# Patient Record
Sex: MALE | Race: WHITE | NOT HISPANIC OR LATINO | Employment: FULL TIME
[De-identification: names, ages, dates, MRNs, and addresses within clinical notes are randomized per-mention and may not be internally consistent; named-entity substitution may affect disease eponyms.]

---

## 2020-07-15 ENCOUNTER — RECORDS - HEALTHEAST (OUTPATIENT)
Dept: ADMINISTRATIVE | Facility: OTHER | Age: 50
End: 2020-07-15

## 2020-07-23 ENCOUNTER — AMBULATORY - HEALTHEAST (OUTPATIENT)
Dept: CARDIAC REHAB | Facility: CLINIC | Age: 50
End: 2020-07-23

## 2020-07-23 DIAGNOSIS — Z95.2 S/P AVR (AORTIC VALVE REPLACEMENT): ICD-10-CM

## 2020-07-31 ENCOUNTER — AMBULATORY - HEALTHEAST (OUTPATIENT)
Dept: CARDIAC REHAB | Facility: CLINIC | Age: 50
End: 2020-07-31

## 2020-07-31 DIAGNOSIS — Z95.2 S/P AVR (AORTIC VALVE REPLACEMENT): ICD-10-CM

## 2020-07-31 RX ORDER — METOPROLOL TARTRATE 25 MG/1
25 TABLET, FILM COATED ORAL 2 TIMES DAILY
Status: SHIPPED | COMMUNITY
Start: 2020-07-31

## 2020-07-31 RX ORDER — AZITHROMYCIN 250 MG/1
250 TABLET, FILM COATED ORAL DAILY
Status: SHIPPED | COMMUNITY
Start: 2020-07-31 | End: 2023-12-11

## 2020-07-31 RX ORDER — OXYCODONE HYDROCHLORIDE 5 MG/1
5 TABLET ORAL EVERY 4 HOURS PRN
Status: SHIPPED | COMMUNITY
Start: 2020-07-31 | End: 2023-12-11

## 2020-07-31 RX ORDER — WARFARIN SODIUM 5 MG/1
5 TABLET ORAL SEE ADMIN INSTRUCTIONS
Status: SHIPPED | COMMUNITY
Start: 2020-07-31 | End: 2023-12-11

## 2020-07-31 RX ORDER — AMOXICILLIN 250 MG
1 CAPSULE ORAL DAILY
Status: SHIPPED | COMMUNITY
Start: 2020-07-31 | End: 2023-12-11

## 2020-07-31 RX ORDER — ACETAMINOPHEN 500 MG
500 TABLET ORAL EVERY 6 HOURS PRN
Status: SHIPPED | COMMUNITY
Start: 2020-07-31

## 2020-08-04 ENCOUNTER — AMBULATORY - HEALTHEAST (OUTPATIENT)
Dept: CARDIAC REHAB | Facility: CLINIC | Age: 50
End: 2020-08-04

## 2020-08-04 DIAGNOSIS — Z95.2 S/P AVR (AORTIC VALVE REPLACEMENT): ICD-10-CM

## 2020-08-06 ENCOUNTER — AMBULATORY - HEALTHEAST (OUTPATIENT)
Dept: CARDIAC REHAB | Facility: CLINIC | Age: 50
End: 2020-08-06

## 2020-08-06 DIAGNOSIS — Z95.2 S/P AVR (AORTIC VALVE REPLACEMENT): ICD-10-CM

## 2020-08-10 ENCOUNTER — AMBULATORY - HEALTHEAST (OUTPATIENT)
Dept: CARDIAC REHAB | Facility: CLINIC | Age: 50
End: 2020-08-10

## 2020-08-10 DIAGNOSIS — Z95.2 S/P AVR (AORTIC VALVE REPLACEMENT): ICD-10-CM

## 2020-08-14 ENCOUNTER — AMBULATORY - HEALTHEAST (OUTPATIENT)
Dept: CARDIAC REHAB | Facility: CLINIC | Age: 50
End: 2020-08-14

## 2020-08-14 DIAGNOSIS — Z95.2 S/P AVR (AORTIC VALVE REPLACEMENT): ICD-10-CM

## 2020-08-17 ENCOUNTER — AMBULATORY - HEALTHEAST (OUTPATIENT)
Dept: CARDIAC REHAB | Facility: CLINIC | Age: 50
End: 2020-08-17

## 2020-08-17 DIAGNOSIS — Z95.2 S/P AVR (AORTIC VALVE REPLACEMENT): ICD-10-CM

## 2020-08-24 ENCOUNTER — AMBULATORY - HEALTHEAST (OUTPATIENT)
Dept: CARDIAC REHAB | Facility: CLINIC | Age: 50
End: 2020-08-24

## 2020-08-24 DIAGNOSIS — Z95.2 S/P AVR (AORTIC VALVE REPLACEMENT): ICD-10-CM

## 2020-08-28 ENCOUNTER — AMBULATORY - HEALTHEAST (OUTPATIENT)
Dept: CARDIAC REHAB | Facility: CLINIC | Age: 50
End: 2020-08-28

## 2020-08-28 DIAGNOSIS — Z95.2 S/P AVR (AORTIC VALVE REPLACEMENT): ICD-10-CM

## 2020-08-31 ENCOUNTER — AMBULATORY - HEALTHEAST (OUTPATIENT)
Dept: CARDIAC REHAB | Facility: CLINIC | Age: 50
End: 2020-08-31

## 2020-08-31 DIAGNOSIS — Z95.2 S/P AVR (AORTIC VALVE REPLACEMENT): ICD-10-CM

## 2020-09-04 ENCOUNTER — AMBULATORY - HEALTHEAST (OUTPATIENT)
Dept: CARDIAC REHAB | Facility: CLINIC | Age: 50
End: 2020-09-04

## 2020-09-04 DIAGNOSIS — Z95.2 S/P AVR (AORTIC VALVE REPLACEMENT): ICD-10-CM

## 2020-09-09 ENCOUNTER — AMBULATORY - HEALTHEAST (OUTPATIENT)
Dept: CARDIAC REHAB | Facility: CLINIC | Age: 50
End: 2020-09-09

## 2020-09-09 DIAGNOSIS — Z95.2 S/P AVR (AORTIC VALVE REPLACEMENT): ICD-10-CM

## 2020-09-14 ENCOUNTER — AMBULATORY - HEALTHEAST (OUTPATIENT)
Dept: CARDIAC REHAB | Facility: CLINIC | Age: 50
End: 2020-09-14

## 2020-09-14 DIAGNOSIS — Z95.2 S/P AVR (AORTIC VALVE REPLACEMENT): ICD-10-CM

## 2020-09-16 ENCOUNTER — AMBULATORY - HEALTHEAST (OUTPATIENT)
Dept: CARDIAC REHAB | Facility: CLINIC | Age: 50
End: 2020-09-16

## 2020-09-16 DIAGNOSIS — Z95.2 S/P AVR (AORTIC VALVE REPLACEMENT): ICD-10-CM

## 2020-09-21 ENCOUNTER — AMBULATORY - HEALTHEAST (OUTPATIENT)
Dept: CARDIAC REHAB | Facility: CLINIC | Age: 50
End: 2020-09-21

## 2020-09-21 DIAGNOSIS — Z95.2 S/P AVR (AORTIC VALVE REPLACEMENT): ICD-10-CM

## 2020-09-23 ENCOUNTER — AMBULATORY - HEALTHEAST (OUTPATIENT)
Dept: CARDIAC REHAB | Facility: CLINIC | Age: 50
End: 2020-09-23

## 2020-09-23 DIAGNOSIS — Z95.2 S/P AVR (AORTIC VALVE REPLACEMENT): ICD-10-CM

## 2020-09-28 ENCOUNTER — AMBULATORY - HEALTHEAST (OUTPATIENT)
Dept: CARDIAC REHAB | Facility: CLINIC | Age: 50
End: 2020-09-28

## 2020-09-28 DIAGNOSIS — Z95.2 S/P AVR (AORTIC VALVE REPLACEMENT): ICD-10-CM

## 2021-05-31 ENCOUNTER — RECORDS - HEALTHEAST (OUTPATIENT)
Dept: ADMINISTRATIVE | Facility: CLINIC | Age: 51
End: 2021-05-31

## 2021-06-04 VITALS — WEIGHT: 222.1 LBS

## 2021-06-04 VITALS — WEIGHT: 217.1 LBS

## 2021-06-04 VITALS — WEIGHT: 218.5 LBS

## 2021-06-04 VITALS — WEIGHT: 223.8 LBS

## 2021-06-04 VITALS — WEIGHT: 222 LBS

## 2021-06-04 VITALS — WEIGHT: 219.2 LBS

## 2021-06-04 VITALS — WEIGHT: 219.7 LBS

## 2021-06-04 VITALS — WEIGHT: 217.3 LBS

## 2021-06-04 VITALS — WEIGHT: 220.1 LBS

## 2021-06-04 VITALS — WEIGHT: 224.8 LBS

## 2021-06-04 VITALS — WEIGHT: 217.8 LBS

## 2021-06-04 VITALS — WEIGHT: 223.2 LBS

## 2021-06-04 VITALS — WEIGHT: 224.6 LBS

## 2021-06-05 VITALS — WEIGHT: 225 LBS

## 2021-06-05 VITALS — WEIGHT: 226.1 LBS

## 2021-06-05 VITALS — WEIGHT: 227 LBS

## 2021-06-10 NOTE — PROGRESS NOTES
ITP ASSESSMENT   Assessment Day: 30 Day    Session Number: 9  Precautions: Surgical     Diagnosis: Valve    Risk Stratification: Medium    Referring Provider: Meli Santiago MD   ITPs sent to Dr. Wilcox  EXERCISE  Exercise Assessment: Reassessment                         Exercise Plan  Goals Next 30 days  ADL'S: Goal #1: Resume riding bike, 1-2x/week    Leisure: Goal #2: Resume mowing lawn without sx.     Work: Goal #3/LTG: Increase to 6 METs or more in rehab to support goals of resuming regular walking/jogging sessions, elliptical use, and snow removal.       Education Goals: Patient can state cardiac s/s and appropriate emergency response.;Has system for taking medication.    Education Goals Met: Medication review.                          Goals Met  Initial ADL's goals met: Goal met: Pt has reached 3.9 METs on TM.    Initial Leisure goals met: Goal met: Pt is exercising 45-60 min daily.     Intial Work goals met: Goal not met: Pt has not reached 6 METs    Initial Progression: Pt has reached 4.2 METs.        Exercise Prescription  Exercise Mode: Treadmill;Bike;Nustep;Stairs    Frequency: 2x/week     Duration: 30-45 mins     Intensity / THR: 20-30 beats above resting heart rate    RPE 11-14  Progression / Met level: 4-4.5    Resistive Training?: No      Current Exercise (mins/week): 420      Interventions  Home Exercise:  Mode: walking    Frequency: 1-2x daily     Duration: 35-60 min      Education Material : Educational videos;Provide written material;Individual education and counseling;Offer educational classes      Education Completed  Exercise Education Completed: Cardiac Anatomy;Signs and Symptoms;Medication review;RPE;Emergency Plan;Home Exercise;Warm up/cool down;FITT Principles;BP/HR Reponse to exercise;Benefits of Exercise;End point of exercise              Exercise Follow-up/Discharge  Follow up/Discharge: Skilled therapy- pt needs monitored ex to safely progress to goal of 6 METs.  He also needs  "regular telemetry monitoring, as he had a lot of PACs during his first ex session.            NUTRITION  Nutrition Assessment: Reassessment      Nutrition Risk Factors:  Nutrition Risk Factors: Dyslipidemia;Overweight  Cholesterol: 162  LDL: 78  HDL: 56  Triglycerides: 139      Nutrition Plan  Interventions  Other Nutrition Intervention: Diet Class;Therapist/Pt Discussion;Educational Videos;Provide with Written Material    Education Completed  Nutrition Education Completed: Risk factor overview      Goals  Nutrition Goals (Next 30 days): Patient knows appropriate portion size;Patient will lose weight      Goals Met  Nutrition Goals Met: Completed Nutritional Risk Screen;Provided Rate your Plate Survey;Reviewed Dietitian schedule      Height, Weight, and  BMI  Weight: 222 lb 1.6 oz (100.7 kg)  Height: 5' 9.5\" (1.765 m)  BMI: 32.34      Nutrition Follow-up  Follow-up/Discharge: Pt understands what his risk factors are.        Other Risk Factors  Other Risk Factor Assessment: Reassessment      HTN Risk Factor: NA      Pre Exercise BP: 122/72  Post Exercise BP: 116/72      Tobacco Risk Factor: NA    Risk Factor Follow-up   Follow-up/Discharge: Pt reports he is compliant with his meds.         PSYCHOSOCIAL  Psychosocial Assessment: Reassessment       [place] COOP Q of L Summary Score: 20      PHQ-9 Total Score: 1      Psychosocial Risk Factor: NA      Psychosocial Plan  Interventions  Interventions: Offer educational videos and classes;Provide written material;Individual education and counseling      Education Completed  Education Completed: Relaxation/Coping Techniques      Goals  Goals (Next 30 days): Identify stressors;Improvement in [place] COOP score;Practicing stress management skills      Goals Met  Goals Met: Identified Support system      Psychosocial Follow-up  Follow-up/Discharge: Pt denies stress at this time. Pt enjoys watching sports.        Patient involved in Goal setting?: Yes      Signature: " _____________________________________________________________    Date: __________________    Time: __________________

## 2021-06-10 NOTE — PROGRESS NOTES
Constantino Laws has participated in 8 sessions of Phase II Cardiac Rehab.    Progress Report:   Cardiac Rehab Treatment Progress Report 8/6/2020 8/10/2020 8/14/2020 8/17/2020 8/24/2020   Weight 217 lbs 2 oz 217 lbs 5 oz 217 lbs 13 oz 218 lbs 8 oz 219 lbs 11 oz   Pre Exercise  HR 67 67 64 78 68   Pre Exercise /68 118/66 118/70 122/68 122/74   Pre Blood Sugar (mg/dl) - - - - -   Treadmill Peak HR 76 82 79 91 85   Treadmill Peak Blood Pressure 142/66 132/68 130/64 130/64 140/70   Heart Rate 70 71 70 80 76   Post Exercise /74 104/70 108/72 112/70 116/72   ECG SR SR SR w/rare-slightly occasional multifocal PVC's, rare PJC's/fusion complexes, rare PAC's -more noted onthe vision bike-pt asymptomatic SR SR   Total Exercise Minutes 40 45 46 45 10         Current Status:  Currently exercising without complaints or symptoms. Patient has reached 3.9 METs on the Treadmill.     If Physician recommends change in treatment plan, please place orders.        __________________________________________________      _____________  Signature                                                                                                  Date

## 2021-06-10 NOTE — PROGRESS NOTES
ITP ASSESSMENT   Assessment Day: Initial    Session Number: 1/2  Precautions: Surgical    Diagnosis: Valve    Risk Stratification: Medium    Referring Provider: System, Provider Not In  EXERCISE  Exercise Assessment: Initial       6 Minute Walk Test   Pre   Pre Exercise HR: 56                    Pre Exercise BP: 120/74      Peak  Peak HR: 86                   Peak BP: 130/68    Peak feet: 1325    Peak O2 SAT: 97    Peak RPE: 4    Peak MPH: 2.51      Symptoms:  Peak Symptoms: denies sx      5 mins. Post  5 Min Post HR: 68    5 Min Post BP: 110/60                           Exercise Plan  Goals Next 30 days  ADL'S: Goal #1: Increase MET level to 3.5 to support goal of resuming grocery shopping.    Leisure: Goal #2: Walk 30-60 mins at home, 3-4x/week.    Work: Goal #3/LTG: Increase to 6 METs or more in rehab to support goals of resuming regular walking/jogging sessions, elliptical use, and snow removal.      Education Goals: Patient can state cardiac s/s and appropriate emergency response.;Has system for taking medication.    Education Goals Met: Medication review.      Exercise Prescription  Exercise Mode: Treadmill;Bike;Nustep;Stairs    Frequency: 2x/week    Duration: 30-45 mins    Intensity / THR: 20-30 beats above resting heart rate    RPE 11-14  Progression / Met level: 3.5-4    Resistive Training?: No      Current Exercise (mins/week): 240      Interventions  Home Exercise:  Mode: Walking    Frequency: 4-5x/day    Duration: 30-40 mins, 4 mini walks      Education Material : Educational videos;Provide written material;Individual education and counseling;Offer educational classes      Education Completed  Exercise Education Completed: Cardiac Anatomy;Signs and Symptoms;Medication review;RPE;Emergency Plan;Home Exercise;Warm up/cool down;FITT Principles;BP/HR Reponse to exercise;Benefits of Exercise;End point of exercise              Exercise Follow-up/Discharge  Follow up/Discharge: Skilled therapy- pt needs  "monitored ex to safely progress to goal of 6 METs.  He also needs regular telemetry monitoring, as he had a lot of PACs during his first ex session.   NUTRITION  Nutrition Assessment: Initial      Nutrition Risk Factors:  Nutrition Risk Factors: Dyslipidemia;Overweight  Cholesterol: 162 (7/18/20)  LDL: 78  HDL: 56  Triglycerides: 139      Nutrition Plan  Interventions  Other Nutrition Intervention: Diet Class;Therapist/Pt Discussion;Educational Videos;Provide with Written Material      Education Completed  Nutrition Education Completed: Risk factor overview      Goals  Nutrition Goals (Next 30 days): Patient can identify their risk factors for CAD;Patient knows appropriate portion size;Patient will lose weight      Goals Met  Nutrition Goals Met: Completed Nutritional Risk Screen;Provided Rate your Plate Survey;Reviewed Dietitian schedule      Height, Weight, and  BMI  Weight: 220 lb 1.6 oz (99.8 kg)  Height: 5' 9.5\" (1.765 m)  BMI: 32.05      Nutrition Follow-up  Follow-up/Discharge: Pt reports drinking 4-5 drinks/week on the weekend.  Pt drinks 1-2 cans of soda per day.  Eats all kinds of meat- beef, chicken, pork, fish.  Pt reports having lost a few lbs since surgery, and wants to lose more.         Other Risk Factors  Other Risk Factor Assessment: Initial      HTN Risk Factor: NA      Pre Exercise BP: 120/74  Post Exercise BP: 110/60        Tobacco Risk Factor: NA          Risk Factor Follow-up   Follow-up/Discharge: Pt reports he is compliant with his meds.     PSYCHOSOCIAL  Psychosocial Assessment: Initial       [facility] Q of L Summary Score: 20      PHQ-9 Total Score: 1      Psychosocial Risk Factor: NA      Psychosocial Plan  Interventions  If PHQ-9 is >9, send letter to MD  Interventions: Offer educational videos and classes;Provide written material;Individual education and counseling       Education Completed  Education Completed: Relaxation/Coping Techniques      Goals  Goals (Next 30 days): " Identify stressors;Improvement in [facility] COOP score;Practicing stress management skills      Goals Met  Goals Met: Identified Support system      Psychosocial Follow-up  Follow-up/Discharge: Pt reports having a low stress level.  He also identifies a good support system, including his wife, daughter, and brother.             Patient involved in Goal setting?: Yes      Signature: _____________________________________________________________    Date: __________________    Time: __________________

## 2021-06-10 NOTE — PROGRESS NOTES
Cardiac Rehab  Phase II Assessment    Assessment Date: 07/15/2020    Diagnosis: AV stenosis  Date of Onset: 02/01/2020  Procedure: AVR  Date of Onset: 7/15/2020  ICD/Pacemaker: No Parameters: NA  Post-op Complications: none  ECG History: SR EF%:69, per echo 2/25/2020  Past Medical History: s/p hernia repair; Factor V defeciency; no DM; lifetime non-smoker; denies lung disease;     Physical Assessment  Precautions/ Physical Limitations: Surgical  Oxygen: No  O2 Sats: 95 Lung Sounds: clear Edema: none  Incisions: clean/dry/intact  Sleeping Pattern: fair   Appetite: good   Nutrition Risk Screen: Completed.    Pain  Location: incisional  Characteristics:pain  Intensity: (0-10 scale) 3  Current Pain Management: Tylenol, oxycodone  Intervention: Tylenol, oxycodone  Response: decreases to 0-1/10    Psychosocial/ Emotional Health  1. In the past 12 months, have you been in a relationship where you have been abused physically, emotionally, sexually or financially? No  notified: NA  2. Who do you turn to for emotional support?: wife, daughter, brother  3. Do you have cultural or spiritual needs? No  4. Have there been any major life changes in the past 12 months? No    Referral Information  Primary Physician: Meli Santiago MD  Cardiologist:   Surgeon: Dr. Alexey Herron    Home exercise/Equipment: TM, elliptical    Patient's long-term goal(s): Resume walk/jog intervals, resume yardwork    1. Living Accommodations: Home Steps: Yes      Support people at home: wife, daughter   2. Marital Status:   3. Family is able to assist with cares      Oriental orthodox/Community involvement: none  4. Recreation/Hobbies: fishing, golfing

## 2021-06-11 NOTE — PROGRESS NOTES
ITP ASSESSMENT   Assessment Day: 60 Day    Session Number: 16  Precautions: Surgical     Diagnosis: Valve    Risk Stratification: Medium    Referring Provider: Meli Santiago MD   ITPs sent to Dr. Wilcox   EXERCISE  Exercise Assessment: Reassessment                         Exercise Plan  Goals Next 30 days  ADL'S: Goal #1: Resume riding bike, 1-2x/week    Leisure: Goal #2: Resume mowing lawn without sx.     Work: Goal #3/LTG: Increase to 6 METs or more in rehab to support goals of resuming regular walking/jogging sessions, elliptical use, and snow removal.       Education Goals: All goals in this section met    Education Goals Met: Medication review.;Patient can state cardiac s/s and appropriate emergency response.;Has system for taking medication.                          Goals Met  30 day ADL'S goals met: Goal 1 met, pt riding bike at home 1-2x/week    30 day Leisure goals met: Goal 2 met, pt tolerating mowing the lawn    30 day Work goals met: LTG not met, continues progressing towards 6 METS    30 Day Progression: Pt has reached 5.4 MET level      Initial ADL's goals met: Goal met: Pt has reached 3.9 METs on TM.    Initial Leisure goals met: Goal met: Pt is exercising 45-60 min daily.     Intial Work goals met: Goal not met: Pt has not reached 6 METs    Initial Progression: Pt has reached 4.2 METs.        Exercise Prescription  Exercise Mode: Treadmill;Bike;Nustep;Stairs    Frequency: 2x/week    Duration: 30-45 min    Intensity / THR: 20-30 beats above resting heart rate; ([place] 137/152 bpm)    RPE 11-14  Progression / Met level: 5.4-6    Resistive Training?: No      Current Exercise (mins/week): 420      Interventions  Home Exercise:  Mode: walking/intervals on treadmill    Frequency: 4x/week    Duration: 35-60 min      Education Material : Educational videos;Provide written material;Individual education and counseling;Offer educational classes      Education Completed  Exercise Education Completed:  "Cardiac Anatomy;Signs and Symptoms;Medication review;RPE;Emergency Plan;Home Exercise;Warm up/cool down;FITT Principles;BP/HR Reponse to exercise;Benefits of Exercise;End point of exercise              Exercise Follow-up/Discharge  Follow up/Discharge: Skilled therapy- pt needs monitored ex to safely progress to goal of 6 METs.  He also needs regular telemetry monitoring, as he had a lot of PACs during his first ex session.    NUTRITION  Nutrition Assessment: Reassessment      Nutrition Risk Factors:  Nutrition Risk Factors: Dyslipidemia;Overweight  Cholesterol: 162  LDL: 78  HDL: 56  Triglycerides: 139      Nutrition Plan  Interventions  Other Nutrition Intervention: Diet Class;Therapist/Pt Discussion;Educational Videos;Provide with Written Material    Follow-Up Rate Your Plate Score: 71      Education Completed  Nutrition Education Completed: Risk factor overview      Goals  Nutrition Goals (Next 30 days): Patient knows appropriate portion size;Patient will lose weight      Goals Met  Nutrition Goals Met: Completed Nutritional Risk Screen;Provided Rate your Plate Survey;Reviewed Dietitian schedule      Height, Weight, and  BMI  Weight: 226 lb 1.6 oz (102.6 kg)  Height: 5' 9.5\" (1.765 m)  BMI: 32.92      Nutrition Follow-up  Follow-up/Discharge: Pt feels he could improve on his diet but is doing well.          Other Risk Factors  Other Risk Factor Assessment: Reassessment      HTN Risk Factor: NA      Pre Exercise BP: 114/66  Post Exercise BP: 130/88      Tobacco Risk Factor: NA   PSYCHOSOCIAL  Psychosocial Assessment: Reassessment       [facility] Q of L Summary Score: 20      PHQ-9 Total Score: 1      Psychosocial Risk Factor: NA      Psychosocial Plan  Interventions  Interventions: Offer educational videos and classes;Provide written material;Individual education and counseling      Education Completed  Education Completed: Relaxation/Coping Techniques      Goals  Goals (Next 30 days): Improvement in " [place] COOP score      Goals Met  Goals Met: Identified Support system      Psychosocial Follow-up  Follow-up/Discharge: Pt denies stress at this time and enjoys watching sports to relax.        Patient involved in Goal setting?: Yes      Signature: _____________________________________________________________    Date: __________________    Time: __________________

## 2021-06-11 NOTE — PROGRESS NOTES
Constantino Laws has participated in 11 sessions of Phase II Cardiac Rehab.    Progress Report:   Cardiac Rehab Treatment Progress Report 8/17/2020 8/24/2020 8/28/2020 8/31/2020 9/4/2020   Weight 218 lbs 8 oz 219 lbs 11 oz 222 lbs 2 oz 222 lbs 223 lbs 3 oz   Pre Exercise  HR 78 68 66 67 66   Pre Exercise /68 122/74 122/72 122/84 118/70   Pre Blood Sugar (mg/dl) - - - - -   Treadmill Peak HR 91 85 86 92 89   Treadmill Peak Blood Pressure 130/64 140/70 144/70 140/78 130/68   Nustep Peak Heart Rate - - 77 78 80   Heart Rate 80 76 71 70 67   Post Exercise /70 116/72 108/68 120/82 112/68   ECG SR SR SR SR SR   Total Exercise Minutes 45 40 40 46 40         Current Status:  Currently exercising without complaints or symptoms.    Therapists Comments: Patient had AVR on 7/15/2020, post op 6 weeks date was 8/26/2020, patient is tolerating exercise well. Is it okay for interval training? yes or no    If Physician recommends change in treatment plan, please place orders.        __________________________________________________      _____________  Signature                                                                                                  Date

## 2021-06-12 NOTE — PROGRESS NOTES
ITP ASSESSMENT   Assessment Day: 90 Day    Session Number: 17  Precautions: Surgical     Diagnosis: Valve    Risk Stratification: Medium    Referring Provider: Meli Santiago MD   ITP sent to Dr. Wilcox  EXERCISE  Exercise Assessment: Discharge                          Exercise Plan  Goals Next 30 days  Work: Goal #3/LTG: Increase to 6 METs or more in rehab to support goals of resuming regular walking/jogging sessions, elliptical use, and snow removal.       Education Goals: All goals in this section met    Education Goals Met: Medication review.;Patient can state cardiac s/s and appropriate emergency response.;Has system for taking medication.                          Goals Met  60 day Work goals met: Patient reached 5.7 METs on interval training on treadmill.  Patient resumed jogging on high intervals.    60 Day Progression: Patient has reached 5.7 METs on treadmill intervals      30 day ADL'S goals met: Goal 1 met, pt riding bike at home 1-2x/week    30 day Leisure goals met: Goal 2 met, pt tolerating mowing the lawn    30 day Work goals met: LTG not met, continues progressing towards 6 METS    30 Day Progression: Pt has reached 5.4 MET level      Initial ADL's goals met: Goal met: Pt has reached 3.9 METs on TM.    Initial Leisure goals met: Goal met: Pt is exercising 45-60 min daily.     Intial Work goals met: Goal not met: Pt has not reached 6 METs    Initial Progression: Pt has reached 4.2 METs.      Exercise Prescription  Exercise Mode: Treadmill;Bike;Nustep;Stairs    Frequency: 2x/week    Duration: 30-45 min    Intensity / THR: 20-30 beats above resting heart rate; ([place] 137/152 bpm)    RPE 11-14  Progression / Met level: 5.4-6    Resistive Training?: No    Interventions  Home Exercise:  Mode: walking/intervals on treadmill    Frequency: 4x/week    Duration: 35-60 min    Education Material : Educational videos;Provide written material;Individual education and counseling;Offer educational  "classes    Education Completed  Exercise Education Completed: Cardiac Anatomy;Signs and Symptoms;Medication review;RPE;Emergency Plan;Home Exercise;Warm up/cool down;FITT Principles;BP/HR Reponse to exercise;Benefits of Exercise;End point of exercise              Exercise Follow-up/Discharge  Follow up/Discharge: Skilled therapy- pt needs monitored ex to safely progress to goal of 6 METs.  He also needs regular telemetry monitoring, as he had a lot of PACs during his first ex session.    NUTRITION  Nutrition Assessment: Discharge    Nutrition Risk Factors:  Nutrition Risk Factors: Dyslipidemia;Overweight  Cholesterol: 162  LDL: 78  HDL: 56  Triglycerides: 139      Nutrition Plan  Interventions  Other Nutrition Intervention: Diet Class;Therapist/Pt Discussion;Educational Videos;Provide with Written Material    Follow-Up Rate Your Plate Score: 71      Education Completed  Nutrition Education Completed: Risk factor overview      Goals  Nutrition Goals (Next 30 days): Patient knows appropriate portion size;Patient will lose weight      Goals Met  Nutrition Goals Met: Completed Nutritional Risk Screen;Provided Rate your Plate Survey;Reviewed Dietitian schedule      Height, Weight, and  BMI  Weight: 225 lb (102.1 kg)  Height: 5' 9.5\" (1.765 m)  BMI: 32.76      Nutrition Follow-up  Follow-up/Discharge: Pt feels he could improve on his diet but is doing well.          Other Risk Factors  Other Risk Factor Assessment: Discharge      HTN Risk Factor: NA      Pre Exercise BP: 118/60  Post Exercise BP: 112/64      Tobacco Risk Factor: NA       PSYCHOSOCIAL  Psychosocial Assessment: Discharge       [facility]: [facility]      PHQ-9 Total Score: 0      Psychosocial Risk Factor: NA      Psychosocial Plan  Interventions  Interventions: Offer educational videos and classes;Provide written material;Individual education and counseling      Education Completed  Education Completed: Relaxation/Coping " Techniques      Goals  Goals (Next 30 days): Improvement in [employer] COOP score      Goals Met  Goals Met: Identified Support system;Improvement in [place] COOP score      Psychosocial Follow-up  Follow-up/Discharge: Pt denies stress at this time and enjoys watching sports to relax.        Patient involved in Goal setting?: Yes      Signature: _____________________________________________________________    Date: __________________    Time: __________________

## 2023-12-11 DIAGNOSIS — J01.01 ACUTE RECURRENT MAXILLARY SINUSITIS: Primary | ICD-10-CM

## 2023-12-11 RX ORDER — AZITHROMYCIN 250 MG/1
TABLET, FILM COATED ORAL
Qty: 6 TABLET | Refills: 0 | Status: SHIPPED | OUTPATIENT
Start: 2023-12-11 | End: 2023-12-29

## 2023-12-24 ENCOUNTER — HEALTH MAINTENANCE LETTER (OUTPATIENT)
Age: 53
End: 2023-12-24

## 2023-12-29 ENCOUNTER — VIRTUAL VISIT (OUTPATIENT)
Dept: FAMILY MEDICINE | Facility: CLINIC | Age: 53
End: 2023-12-29
Payer: COMMERCIAL

## 2023-12-29 DIAGNOSIS — J01.01 ACUTE RECURRENT MAXILLARY SINUSITIS: Primary | ICD-10-CM

## 2023-12-29 DIAGNOSIS — Z12.11 SCREEN FOR COLON CANCER: ICD-10-CM

## 2023-12-29 PROBLEM — Z79.01 ANTICOAGULATION MONITORING, INR RANGE 2-3: Status: ACTIVE | Noted: 2020-07-22

## 2023-12-29 PROCEDURE — 99203 OFFICE O/P NEW LOW 30 MIN: CPT | Mod: VID | Performed by: NURSE PRACTITIONER

## 2023-12-29 RX ORDER — AZITHROMYCIN 250 MG/1
TABLET, FILM COATED ORAL
Qty: 6 TABLET | Refills: 1 | Status: SHIPPED | OUTPATIENT
Start: 2023-12-29 | End: 2024-01-03

## 2023-12-29 RX ORDER — ROSUVASTATIN CALCIUM 40 MG/1
40 TABLET, COATED ORAL DAILY
COMMUNITY

## 2023-12-29 NOTE — PROGRESS NOTES
Constantino is a 53 year old who is being evaluated via a billable video visit.      How would you like to obtain your AVS? MyChart  If the video visit is dropped, the invitation should be resent by: Text to cell phone: [phone]  Will anyone else be joining your video visit? No      Subjective   Constantino is a 53 year old, presenting for the following health issues:  Sinus Problem (X 1 week. Mucinex a little helpful.  sinus pressure, tender to touch.)        12/29/2023    10:59 AM   Additional Questions   Roomed by Josefina MORRISON       Sinus Problem     History of Present Illness       Reason for visit:  Sinus infection  Symptom onset:  3-7 days ago  Symptoms include:  Stuffy  Symptom intensity:  Mild  Symptom progression:  Staying the same  Had these symptoms before:  Yes  Has tried/received treatment for these symptoms:  Yes  Previous treatment was successful:  Yes  Prior treatment description:  Anti biotic    He eats 0-1 servings of fruits and vegetables daily.He consumes 1 sweetened beverage(s) daily.He exercises with enough effort to increase his heart rate 20 to 29 minutes per day.  He exercises with enough effort to increase his heart rate 5 days per week.   He is taking medications regularly.       Hyperlipidemia Follow-Up    Are you regularly taking any medication or supplement to lower your cholesterol?   Yes-    Are you having muscle aches or other side effects that you think could be caused by your cholesterol lowering medication?  No      Review of Systems   Constitutional, HEENT, cardiovascular, pulmonary, gi and gu systems are negative, except as otherwise noted.    Sinu pain and pressure x 1 week, purulent drainage.  Hx sinus infections.    Objective           Vitals:  No vitals were obtained today due to virtual visit.    Physical Exam   GENERAL: Healthy, alert and no distress  EYES: Eyes grossly normal to inspection.  No discharge or erythema, or obvious scleral/conjunctival abnormalities.  RESP: No audible  wheeze, cough, or visible cyanosis.  No visible retractions or increased work of breathing.    SKIN: Visible skin clear. No significant rash, abnormal pigmentation or lesions.  NEURO: Cranial nerves grossly intact.  Mentation and speech appropriate for age.  PSYCH: Mentation appears normal, affect normal/bright, judgement and insight intact, normal speech and appearance well-groomed.    [facility] results reviewed    A/P:  (J01.01) Acute recurrent maxillary sinusitis  (primary encounter diagnosis)  Comment:   Plan: azithromycin (ZITHROMAX) 250 MG tablet                    Video-Visit Details    Type of service:  Video Visit     Originating Location (pt. Location): Home    Distant Location (provider location):  On-site  Platform used for Video Visit: Alec

## 2024-02-26 DIAGNOSIS — J01.01 ACUTE RECURRENT MAXILLARY SINUSITIS: Primary | ICD-10-CM

## 2024-02-26 RX ORDER — AZITHROMYCIN 250 MG/1
TABLET, FILM COATED ORAL
Qty: 6 TABLET | Refills: 1 | Status: SHIPPED | OUTPATIENT
Start: 2024-02-26 | End: 2024-03-02

## 2024-09-25 ENCOUNTER — APPOINTMENT (OUTPATIENT)
Dept: ULTRASOUND IMAGING | Facility: CLINIC | Age: 54
End: 2024-09-25
Attending: EMERGENCY MEDICINE
Payer: COMMERCIAL

## 2024-09-25 ENCOUNTER — APPOINTMENT (OUTPATIENT)
Dept: CT IMAGING | Facility: CLINIC | Age: 54
End: 2024-09-25
Attending: EMERGENCY MEDICINE
Payer: COMMERCIAL

## 2024-09-25 ENCOUNTER — HOSPITAL ENCOUNTER (EMERGENCY)
Facility: CLINIC | Age: 54
Discharge: HOME | End: 2024-09-25
Attending: EMERGENCY MEDICINE | Admitting: EMERGENCY MEDICINE
Payer: COMMERCIAL

## 2024-09-25 ENCOUNTER — TELEPHONE (OUTPATIENT)
Dept: SURGERY | Facility: CLINIC | Age: 54
End: 2024-09-25

## 2024-09-25 VITALS
OXYGEN SATURATION: 97 % | TEMPERATURE: 97.6 F | HEART RATE: 62 BPM | DIASTOLIC BLOOD PRESSURE: 103 MMHG | RESPIRATION RATE: 18 BRPM | HEIGHT: 69 IN | WEIGHT: 230 LBS | BODY MASS INDEX: 34.07 KG/M2 | SYSTOLIC BLOOD PRESSURE: 162 MMHG

## 2024-09-25 DIAGNOSIS — K40.20 BILATERAL INGUINAL HERNIA WITHOUT OBSTRUCTION OR GANGRENE, RECURRENCE NOT SPECIFIED: ICD-10-CM

## 2024-09-25 LAB
ALBUMIN SERPL BCG-MCNC: 4.2 G/DL (ref 3.5–5.2)
ALBUMIN UR-MCNC: NEGATIVE MG/DL
ALP SERPL-CCNC: 79 U/L (ref 40–150)
ALT SERPL W P-5'-P-CCNC: 46 U/L (ref 0–70)
ANION GAP SERPL CALCULATED.3IONS-SCNC: 13 MMOL/L (ref 7–15)
APPEARANCE UR: CLEAR
AST SERPL W P-5'-P-CCNC: 38 U/L (ref 0–45)
BILIRUB DIRECT SERPL-MCNC: <0.2 MG/DL (ref 0–0.3)
BILIRUB SERPL-MCNC: 0.3 MG/DL
BILIRUB UR QL STRIP: NEGATIVE
BUN SERPL-MCNC: 17.9 MG/DL (ref 6–20)
CALCIUM SERPL-MCNC: 9.4 MG/DL (ref 8.8–10.4)
CHLORIDE SERPL-SCNC: 103 MMOL/L (ref 98–107)
COLOR UR AUTO: YELLOW
CREAT SERPL-MCNC: 0.89 MG/DL (ref 0.67–1.17)
EGFRCR SERPLBLD CKD-EPI 2021: >90 ML/MIN/1.73M2
GLUCOSE SERPL-MCNC: 95 MG/DL (ref 70–99)
GLUCOSE UR STRIP-MCNC: NEGATIVE MG/DL
HCO3 SERPL-SCNC: 23 MMOL/L (ref 22–29)
HGB UR QL STRIP: NEGATIVE
HOLD SPECIMEN: NORMAL
HOLD SPECIMEN: NORMAL
KETONES UR STRIP-MCNC: NEGATIVE MG/DL
LEUKOCYTE ESTERASE UR QL STRIP: NEGATIVE
MUCOUS THREADS #/AREA URNS LPF: PRESENT /LPF
NITRATE UR QL: NEGATIVE
NT-PROBNP SERPL-MCNC: 185 PG/ML (ref 0–900)
PH UR STRIP: 6 [PH] (ref 5–7)
POTASSIUM SERPL-SCNC: 4.5 MMOL/L (ref 3.4–5.3)
PROT SERPL-MCNC: 7.1 G/DL (ref 6.4–8.3)
RBC URINE: <1 /HPF
SODIUM SERPL-SCNC: 139 MMOL/L (ref 135–145)
SP GR UR STRIP: 1.02 (ref 1–1.03)
UROBILINOGEN UR STRIP-MCNC: <2 MG/DL
WBC URINE: <1 /HPF

## 2024-09-25 PROCEDURE — 81001 URINALYSIS AUTO W/SCOPE: CPT | Performed by: EMERGENCY MEDICINE

## 2024-09-25 PROCEDURE — 82374 ASSAY BLOOD CARBON DIOXIDE: CPT | Performed by: EMERGENCY MEDICINE

## 2024-09-25 PROCEDURE — 87491 CHLMYD TRACH DNA AMP PROBE: CPT | Performed by: EMERGENCY MEDICINE

## 2024-09-25 PROCEDURE — 76870 US EXAM SCROTUM: CPT

## 2024-09-25 PROCEDURE — 84075 ASSAY ALKALINE PHOSPHATASE: CPT | Performed by: EMERGENCY MEDICINE

## 2024-09-25 PROCEDURE — 74177 CT ABD & PELVIS W/CONTRAST: CPT

## 2024-09-25 PROCEDURE — 250N000011 HC RX IP 250 OP 636: Performed by: EMERGENCY MEDICINE

## 2024-09-25 PROCEDURE — 83880 ASSAY OF NATRIURETIC PEPTIDE: CPT | Performed by: EMERGENCY MEDICINE

## 2024-09-25 PROCEDURE — 87591 N.GONORRHOEAE DNA AMP PROB: CPT | Performed by: EMERGENCY MEDICINE

## 2024-09-25 PROCEDURE — 99285 EMERGENCY DEPT VISIT HI MDM: CPT | Mod: 25

## 2024-09-25 PROCEDURE — 36415 COLL VENOUS BLD VENIPUNCTURE: CPT | Performed by: EMERGENCY MEDICINE

## 2024-09-25 RX ORDER — IOPAMIDOL 755 MG/ML
90 INJECTION, SOLUTION INTRAVASCULAR ONCE
Status: COMPLETED | OUTPATIENT
Start: 2024-09-25 | End: 2024-09-25

## 2024-09-25 RX ADMIN — IOPAMIDOL 90 ML: 755 INJECTION, SOLUTION INTRAVENOUS at 14:05

## 2024-09-25 ASSESSMENT — ENCOUNTER SYMPTOMS
SHORTNESS OF BREATH: 0
DYSURIA: 1
DIFFICULTY URINATING: 0
COUGH: 0
FEVER: 0
ABDOMINAL PAIN: 0

## 2024-09-25 ASSESSMENT — ACTIVITIES OF DAILY LIVING (ADL)
ADLS_ACUITY_SCORE: 35
ADLS_ACUITY_SCORE: 35
ADLS_ACUITY_SCORE: 33
ADLS_ACUITY_SCORE: 35

## 2024-09-25 ASSESSMENT — COLUMBIA-SUICIDE SEVERITY RATING SCALE - C-SSRS
1. IN THE PAST MONTH, HAVE YOU WISHED YOU WERE DEAD OR WISHED YOU COULD GO TO SLEEP AND NOT WAKE UP?: NO
2. HAVE YOU ACTUALLY HAD ANY THOUGHTS OF KILLING YOURSELF IN THE PAST MONTH?: NO
6. HAVE YOU EVER DONE ANYTHING, STARTED TO DO ANYTHING, OR PREPARED TO DO ANYTHING TO END YOUR LIFE?: NO

## 2024-09-25 NOTE — ED NOTES
Bed: [facility]-20  Expected date:   Expected time:   Means of arrival:   Comments:  Groin swelling will go here once clean

## 2024-09-25 NOTE — PROGRESS NOTES
53-year-old male diagnosed with bilateral inguinal hernias that are very large.  Patient is not having any obstructive symptoms at this time.  Recommendation is patient can be discharged from the ED if he is stable.  I will have my surgical team contact the patient for a clinic follow-up 10/1/2024 to discuss surgical intervention.    Plan   - okay for discharge stable from ED perspective  -If the patient has worsening symptoms this weekend, the patient can come to [facility] ED to be further evaluated.  I am the surgeon on-call over the weekend.    Jj Blake,   General Surgeon  [facility]  Surgery Clinic - [address]  [place] [address]?  Office: [phone]  Employed by - [facility] [employer]  Pager: [phone]

## 2024-09-25 NOTE — ED TRIAGE NOTES
"Pt was at urgent care and sent here for inguinal hernia. Pt states \"very swollen privates\" states started last weekend, had been doing some lifting at work last weekend. Denies pain. Denies fevers. Denies urinary Sx, denies n/v. Urgent care paperwork states very swollen scrotum and large inguinal hernia.         "

## 2024-09-25 NOTE — ED PROVIDER NOTES
EMERGENCY DEPARTMENT ENCOUNTER       ED Course & Medical Decision Making     11:49 AM I met the patient and performed my initial interview and exam.  1:34 PM Rechecked and updated patient.    I saw and examined the patient.  He is not having any significant pain and did not require any additional medications.  Diagnostics ordered.    He denies possibility of STI but GC and chlamydia urine was ordered and is pending.     Laboratory studies returned unremarkable.  Ultrasound is consistent with bilateral inguinal hernias and there is no sign of torsion no ultrasound evidence for epididymitis or orchitis.    CT scan confirms bilateral fat-containing inguinal hernias with secondary scrotal swelling.    I did discuss the case with Dr. Blake, general surgery and plan is to expedite surgical repair though this does not need to be emergently done.    His office will get in contact with the patient for scheduling but if anything worsens or if the patient develops pain before then he would go to [facility] and get all of the surgical team then through the er      Medical Decision Making  Obtained supplemental history:Supplemental history obtained?: Documented in chart  Reviewed external records: External records reviewed?: Documented in chart  Care impacted by chronic illness:Anticoagulated State, Hyperlipidemia, and Other: Factor V Leiden  Care significantly affected by social determinants of health:N/A  Did you consider but not order tests?: Work up considered but not performed and documented in chart, if applicable  Did you interpret images independently?: Independent interpretation of ECG and images noted in documentation, when applicable.  Consultation discussion with other provider:Did you involve another provider (consultant, MH, pharmacy, etc.)?: I discussed the care with another health care provider, see documentation for details.  Discharge. No recommendations on prescription strength medication(s). See  "documentation for any additional details.        Prior to making a final disposition on this patient the results of patient's tests and other diagnostic studies were discussed with the patient. All questions were answered. Patient expressed understanding of the plan and was amenable to it.    Medications   iopamidol (ISOVUE-370) solution 90 mL (90 mLs Intravenous $Given 9/25/24 7807)       Final Impression     1. Bilateral inguinal hernia without obstruction or gangrene, recurrence not specified            Chief Complaint     Chief Complaint   Patient presents with    Groin Swelling    Penis/Scrotum Problem       Pt was at urgent care and sent here for inguinal hernia. Pt states \"very swollen privates\" states started last weekend, had been doing some lifting at work last weekend. Denies pain. Denies fevers. Denies urinary Sx, denies n/v. Urgent care paperwork states very swollen scrotum and large inguinal hernia.             HPI       Constantino Laws is a pleasant 53 year old male, history of hyperlipidemia, who presents via walk-in for evaluation of groin selling.     On 20-Sep-2024, the patient was flying for work and was stopped by the TSA, which is when he noted increased groin and scrotum swelling. The swelling has not improved since then, and today he went to urgent care. They suspected hernia and sent the patient to the ER for further work up. He notes a very minor amount of dysuria.    The patient denies any heavy lifting. He denies testicular pain, difficulty urinating, and bowel abnormalities.       IPuneet am serving as a scribe to document services personally performed by Alexey Navarro M.D. based on my observation and the provider's statements to me. I, Alexey Navarro M.D attest that Puneet Kong is acting in a scribe capacity, has observed my performance of the services and has documented them in accordance with my direction.    Past Medical History     Past Medical History:   Diagnosis " "Date    Aortic valve stenosis      Past Surgical History:   Procedure Laterality Date    AORTIC VALVE SURGERY  07/2020    HERNIA REPAIR  12/12/2013    WISDOM TOOTH EXTRACTION      teenager     Family History   Problem Relation Age of Onset    Hyperlipidemia Mother     No Known Problems Brother     Hyperlipidemia Maternal Grandmother     Hyperlipidemia Maternal Grandfather     Heart Disease Maternal Grandfather       Social History     Tobacco Use    Smoking status: Never     Passive exposure: Never    Smokeless tobacco: Never   Vaping Use    Vaping status: Never Used   Substance Use Topics    Alcohol use: Yes    Drug use: Never       Relevant past medical, surgical, family and social history as documented above, has been reviewed and discussed with patient. No changes or additions, unless otherwise noted in the HPI.    Current Medications     acetaminophen (TYLENOL) 500 MG tablet  aspirin 81 MG EC tablet  cholecalciferol, vitamin D3, 1,000 unit (25 mcg) tablet  metoprolol tartrate (LOPRESSOR) 25 MG tablet  multivitamin therapeutic tablet  rosuvastatin (CRESTOR) 40 MG tablet        Allergies     No Known Allergies    Review of Systems     Review of Systems   Constitutional:  Negative for fever.   Respiratory:  Negative for cough and shortness of breath.    Cardiovascular:  Negative for chest pain.   Gastrointestinal:  Negative for abdominal pain.        Negative for bowel abnormalities   Genitourinary:  Positive for dysuria and scrotal swelling. Negative for difficulty urinating and testicular pain.   Skin:  Negative for rash.   All other systems reviewed and are negative.       Remainder of systems reviewed, unless noted in HPI all others negative.    Physical Exam     BP (!) 162/103   Pulse 62   Temp 97.6  F (36.4  C) (Temporal)   Resp 18   Ht 1.753 m (5' 9\")   Wt 104.3 kg (230 lb)   SpO2 97%   BMI 33.97 kg/m      Physical Exam  Vitals and nursing note reviewed.   Constitutional:       General: He is not in " acute distress.  HENT:      Head: Normocephalic.      Nose: Nose normal.   Eyes:      General: No scleral icterus.  Cardiovascular:      Rate and Rhythm: Normal rate.   Pulmonary:      Effort: Pulmonary effort is normal.   Abdominal:      Hernia: A hernia is present. Hernia is present in the left inguinal area and right inguinal area.   Genitourinary:     Penis: Normal. No tenderness or discharge.       Testes: Cremasteric reflex is present.         Right: Swelling present. Tenderness not present. Cremasteric reflex is present.          Left: Swelling present. Tenderness not present. Cremasteric reflex is present.    Musculoskeletal:      Cervical back: Neck supple.   Skin:     Findings: No rash.   Neurological:      Mental Status: He is alert. Mental status is at baseline.   Psychiatric:         Mood and Affect: Mood normal.             Labs & Imaging         Labs Ordered and Resulted from Time of ED Arrival to Time of ED Departure   ROUTINE UA WITH MICROSCOPIC REFLEX TO CULTURE - Abnormal       Result Value    Color Urine Yellow      Appearance Urine Clear      Glucose Urine Negative      Bilirubin Urine Negative      Ketones Urine Negative      Specific Gravity Urine 1.025      Blood Urine Negative      pH Urine 6.0      Protein Albumin Urine Negative      Urobilinogen Urine <2.0      Nitrite Urine Negative      Leukocyte Esterase Urine Negative      Mucus Urine Present (*)     RBC Urine <1      WBC Urine <1     BASIC METABOLIC PANEL - Normal    Sodium 139      Potassium 4.5      Chloride 103      Carbon Dioxide (CO2) 23      Anion Gap 13      Urea Nitrogen 17.9      Creatinine 0.89      GFR Estimate >90      Calcium 9.4      Glucose 95     HEPATIC FUNCTION PANEL - Normal    Protein Total 7.1      Albumin 4.2      Bilirubin Total 0.3      Alkaline Phosphatase 79      AST 38      ALT 46      Bilirubin Direct <0.20     NT PROBNP INPATIENT - Normal    N terminal Pro BNP Inpatient 185     CHLAMYDIA TRACHOMATIS PCR    NEISSERIA GONORRHOEAE PCR         Results for orders placed or performed during the hospital encounter of 09/25/24   US Testicular & Scrotum w Doppler Ltd    Impression    IMPRESSION:  1.  Fat in the inguinal canals and scrotum likely accounts for the majority of the swelling.  2.  No testicular torsion. No ultrasound evidence of epididymitis or orchitis.  3.  Bilateral varicoceles.     CT Abdomen Pelvis w Contrast    Impression    IMPRESSION:     1.  There are large bilateral inguinal hernia through which noninflamed retroperitoneal fat extends into the scrotum producing scrotal distention/swelling.  2.  No radiopaque urinary tract calculi or obstruction.  3.  Sigmoid diverticulosis.     Basic metabolic panel   Result Value Ref Range    Sodium 139 135 - 145 mmol/L    Potassium 4.5 3.4 - 5.3 mmol/L    Chloride 103 98 - 107 mmol/L    Carbon Dioxide (CO2) 23 22 - 29 mmol/L    Anion Gap 13 7 - 15 mmol/L    Urea Nitrogen 17.9 6.0 - 20.0 mg/dL    Creatinine 0.89 0.67 - 1.17 mg/dL    GFR Estimate >90 >60 mL/min/1.73m2    Calcium 9.4 8.8 - 10.4 mg/dL    Glucose 95 70 - 99 mg/dL   Hepatic function panel   Result Value Ref Range    Protein Total 7.1 6.4 - 8.3 g/dL    Albumin 4.2 3.5 - 5.2 g/dL    Bilirubin Total 0.3 <=1.2 mg/dL    Alkaline Phosphatase 79 40 - 150 U/L    AST 38 0 - 45 U/L    ALT 46 0 - 70 U/L    Bilirubin Direct <0.20 0.00 - 0.30 mg/dL   Nt probnp inpatient (BNP)   Result Value Ref Range    N terminal Pro BNP Inpatient 185 0 - 900 pg/mL   UA with Microscopic reflex to Culture    Specimen: Urine, Clean Catch   Result Value Ref Range    Color Urine Yellow Colorless, Straw, Light Yellow, Yellow    Appearance Urine Clear Clear    Glucose Urine Negative Negative mg/dL    Bilirubin Urine Negative Negative    Ketones Urine Negative Negative mg/dL    Specific Gravity Urine 1.025 1.005 - 1.030    Blood Urine Negative Negative    pH Urine 6.0 5.0 - 7.0    Protein Albumin Urine Negative Negative mg/dL    Urobilinogen  Urine <2.0 <2.0 mg/dL    Nitrite Urine Negative Negative    Leukocyte Esterase Urine Negative Negative    Mucus Urine Present (A) None Seen /LPF    RBC Urine <1 <=2 /HPF    WBC Urine <1 <=5 /HPF   Extra Purple Top Tube   Result Value Ref Range    Hold Specimen JIC    Extra Purple Top Tube   Result Value Ref Range    Hold Specimen JIC        Alexey Navarro MD  Emergency Medicine  [facility] EMERGENCY ROOM  [address] [address]  [phone]  9/25/2024         Alexey Navarro MD  09/25/24 1629

## 2024-09-25 NOTE — TELEPHONE ENCOUNTER
Patient is scheduled to see Dr. Blake on 10/1/2024 at 1100.    Adelaida REED RN, BSN    [facility]  General Surgery  [address]  [address]  [place] [id number]  Office: [phone]  Employed by [employer]      ----- Message from Jj Blake sent at 9/25/2024  3:26 PM CDT -----  Regarding: B/L inguinal hernias  Please reach out to this patient and have him come see me in clinic next Tuesday.  Okay to double book.  Diagnoses bilateral inguinal hernias.    Thanks all,     LV

## 2024-09-26 LAB
C TRACH DNA SPEC QL NAA+PROBE: NEGATIVE
N GONORRHOEA DNA SPEC QL NAA+PROBE: NEGATIVE

## 2024-10-01 ENCOUNTER — OFFICE VISIT (OUTPATIENT)
Dept: SURGERY | Facility: CLINIC | Age: 54
End: 2024-10-01
Payer: COMMERCIAL

## 2024-10-01 ENCOUNTER — TELEPHONE (OUTPATIENT)
Dept: SURGERY | Facility: CLINIC | Age: 54
End: 2024-10-01

## 2024-10-01 VITALS
DIASTOLIC BLOOD PRESSURE: 78 MMHG | WEIGHT: 243.9 LBS | HEIGHT: 69 IN | SYSTOLIC BLOOD PRESSURE: 146 MMHG | BODY MASS INDEX: 36.12 KG/M2

## 2024-10-01 DIAGNOSIS — E66.01 CLASS 2 SEVERE OBESITY DUE TO EXCESS CALORIES WITH SERIOUS COMORBIDITY IN ADULT, UNSPECIFIED BMI (H): ICD-10-CM

## 2024-10-01 DIAGNOSIS — E66.812 CLASS 2 SEVERE OBESITY DUE TO EXCESS CALORIES WITH SERIOUS COMORBIDITY IN ADULT, UNSPECIFIED BMI (H): ICD-10-CM

## 2024-10-01 DIAGNOSIS — K40.20 NON-RECURRENT BILATERAL INGUINAL HERNIA WITHOUT OBSTRUCTION OR GANGRENE: Primary | ICD-10-CM

## 2024-10-01 PROCEDURE — 99204 OFFICE O/P NEW MOD 45 MIN: CPT | Performed by: SURGERY

## 2024-10-01 RX ORDER — CEFAZOLIN SODIUM/WATER 2 G/20 ML
2 SYRINGE (ML) INTRAVENOUS
Status: CANCELLED | OUTPATIENT
Start: 2024-10-04

## 2024-10-01 RX ORDER — CEFAZOLIN SODIUM/WATER 2 G/20 ML
2 SYRINGE (ML) INTRAVENOUS SEE ADMIN INSTRUCTIONS
Status: CANCELLED | OUTPATIENT
Start: 2024-10-04

## 2024-10-01 NOTE — LETTER
10/1/2024      Constantino Laws  [address] [address]      Dear Colleague,    Thank you for referring your patient, Constantino Laws, to the [facility] AND BARIATRICS CARE [place]. Please see a copy of my visit note below.    General Surgery H&P  Constantino Laws MRN# [id number]   Age/Sex: 53 year old male YOB: 1970     Reason for visit: Bilateral inguinal hernias       Referring physician: Dr. Navarro                    Assessment and Plan:   Assessment:  Bilateral inguinal hernias  History of aortic valve replacement    53-year-old male presenting with bilateral inguinal hernias that are very large.  Patient has no obstructive symptoms at this time.  It is mildly tender to palpation.    Plan:  -Will schedule the patient for the OR this Friday if possible for robotic bilateral inguinal hernia repairs.  -The risks and benefits of the procedure were explained detail to the patient. The risks include infection, bleeding, damage to the surrounding structures. Patient verbalized understanding provided consent to undergo the procedure above.  -I discussed in detail with the patient regarding mesh placement.  I explained the different options of repairing the hernia.  If the hernia was repaired primarily without mesh, there is a higher chance of recurrence.  We discussed in detail using synthetic versus biologic mesh.  I explained to the different scenarios in which each mesh would be more appropriate.  Patient was allowed time to ask questions and concerns regarding mesh placement.  The patient is accepting of the use of mesh with the hernia repair.  -Patient will require medical clearance prior to surgery due to his cardiac history            Chief Complaint:     Chief Complaint   Patient presents with     Consult For     Bilateral ing hernia per LV.          History is obtained from the patient    HPI:   Constantino Laws is a 53 year old male who presents to  the general surgery team today for evaluation regarding bilateral inguinal hernias.  The patient recently developed swelling over the area.  Patient has no nausea no vomiting.  Passing flatus and bowels.  Patient states that he does have some mild tenderness to the area.  Denies chest pain shortness of breath.  Patient has a history of aortic valve replaced.  No blood thinners or antiplatelet.          Past Medical History:     Past Medical History:   Diagnosis Date     Aortic valve stenosis               Past Surgical History:     Past Surgical History:   Procedure Laterality Date     AORTIC VALVE SURGERY  07/2020     HERNIA REPAIR  12/12/2013     WISDOM TOOTH EXTRACTION      teenager             Social History:    reports that he has never smoked. He has never been exposed to tobacco smoke. He has never used smokeless tobacco. He reports current alcohol use. He reports that he does not use drugs.           Family History:     Family History   Problem Relation Age of Onset     Hyperlipidemia Mother      No Known Problems Brother      Hyperlipidemia Maternal Grandmother      Hyperlipidemia Maternal Grandfather      Heart Disease Maternal Grandfather               Allergies:   No Known Allergies           Medications:     Prior to Admission medications    Medication Sig Start Date End Date Taking? Authorizing Provider   acetaminophen (TYLENOL) 500 MG tablet [ACETAMINOPHEN (TYLENOL) 500 MG TABLET] Take 500 mg by mouth every 6 (six) hours as needed for pain. 7/31/20  Yes Provider, Historical   aspirin 81 MG EC tablet [ASPIRIN 81 MG EC TABLET] Take 81 mg by mouth daily. 7/31/20  Yes Provider, Historical   cholecalciferol, vitamin D3, 1,000 unit (25 mcg) tablet [CHOLECALCIFEROL, VITAMIN D3, 1,000 UNIT (25 MCG) TABLET] Take 1,000 Units by mouth daily. 7/31/20  Yes Provider, Historical   metoprolol tartrate (LOPRESSOR) 25 MG tablet [METOPROLOL TARTRATE (LOPRESSOR) 25 MG TABLET] Take 25 mg by mouth 2 (two) times a day.  "7/31/20  Yes Provider, Historical   multivitamin therapeutic tablet [MULTIVITAMIN THERAPEUTIC TABLET] Take 1 tablet by mouth daily. 7/31/20  Yes Provider, Historical   rosuvastatin (CRESTOR) 40 MG tablet Take 40 mg by mouth daily   Yes Reported, Patient              Review of Systems:   A 12 point Review of Systems is negative other than noted in the HPI            Physical Exam:   Patient Vitals for the past 24 hrs:   BP Height Weight   10/01/24 1056 (!) 146/78 1.753 m (5' 9\") 110.6 kg (243 lb 14.4 oz)        [unfilled]   Constitutional:   awake, alert, cooperative, no apparent distress, and appears stated age       Eyes:   PERRL, conjunctiva/corneas clear, EOM's intact; no scleral edema or icterus noted        ENT:   Normocephalic, without obvious abnormality, atraumatic, Lips, mucosa, and tongue normal        Hematologic / Lymphatic:   No lymphadenopathy       Lungs:   Normal respiratory effort, no accessory muscle use       Cardiovascular:   Regular rate and rhythm       Abdomen:   Soft, nondistended, nontender to palpation.  Large bilateral inguinal hernias.  The bilateral inguinal hernias are partially reducible       Musculoskeletal:   No obvious swelling, bruising or deformity       Skin:   Skin color and texture normal for patient, no rashes or lesions              Data:         All imaging studies reviewed by me. I reviewed the images, and I agree with very large bilateral inguinal hernias      DO Jj Blakely DO  General Surgeon  [facility]  Surgery [address]  [place] [address]?  Office: [phone]  Employed by - [facility] [employer]  Pager: [phone]       Again, thank you for allowing me to participate in the care of your patient.        Sincerely,        Jj Blake DO  "

## 2024-10-01 NOTE — LETTER
Pre-op Physical: 10/2/2024 @ 9:40am.  [facility].    Surgery Date: 10/4/2024     Location: [place] [address]    Approximate Arrival Time: 8:00 am  (Unless instructed differently by the pre-op call nurse)     Post op Appointment: 10/25/2024 at  1:45 pm with  Dr Blake  [facility], [address] [place] [address] [address], [place] [address].    Pre-Surgical Tasks:     Review all medications with your primary care or prescribing physician; they will advise you which meds to stop and when, and when you can resume taking.  Certain medications like blood thinners and weight loss medications need to be stopped in advance of surgery to proceed safely.      Blood thinners including but not exclusive to drugs like Xarelto, Eliquis, Warfarin and Aspirin, should be stopped five days before surgery, if your prescribing provider agrees. Follow your provider's advice on stopping blood thinners because they know you best.  If you are unsure if your medication is a blood thinner, ask your prescribing provider.    Weight loss medications: There are multiple medications being used for weight management and diabetes today, and the list is growing.  Phentermine, Ozempic, Wegovy, Trulicity, and other similar medications need to be stopped one week before surgery to avoid being cancelled.  Victoza and Saxenda can be continued longer but must be stopped one full day before surgery.  Please ask your prescribing provider for advice.    Diabetic medications: in addition to the medications talked about above that are used for either weight loss or diabetes, some people are on insulin that may require adjustment.  Please discuss managing diabetic medications with your prescribing doctor as these medications may require modification prior to surgery.     Please shower the evening before and morning of surgery with Hibiclens soap.  Any  [facility] can provide this to you at no cost, or it can be found at your local pharmacy.     Fasting instructions will be provided by the pre-op nurse who will call you 1-3 days before surgery.  Typically, we advise normal food up to 8 hours before you arrive for surgery. Clear liquids only from then until 2 hours before you arrive surgery, then nothing at all by mouth.  The nurse will review your specific instructions with you at the call.      Smoking impacts your body's ability to heal properly so we advise patients to quit if possible before surgery.  Plastic Surgery patients are required to be nicotine free for at least 8 weeks before surgery.      You will need an adult to drive you home and stay with you 24 hours after surgery. Public transportation or Medical Van Services are not permitted.    Visitor restrictions are subject to change, please verify with the pre-op nurse when they call how many people are permitted to accompany you.    We always encourage you to notify your insurance any time you have medical tests or procedures scheduled including surgery. The number is usually right on the back of your insurance card. To obtain pricing for surgery, please call [facility] Cost of Care at [phone] or email [email or web address].        Call our office if you have any questions! Thank you!     Darrius Perales  Complex   [facility] Surgical Specialties  [phone]

## 2024-10-01 NOTE — PROGRESS NOTES
General Surgery H&P  Constantino Laws MRN# [id number]   Age/Sex: 53 year old male YOB: 1970     Reason for visit: Bilateral inguinal hernias       Referring physician: Dr. Navarro                    Assessment and Plan:   Assessment:  Bilateral inguinal hernias  History of aortic valve replacement    53-year-old male presenting with bilateral inguinal hernias that are very large.  Patient has no obstructive symptoms at this time.  It is mildly tender to palpation.    Plan:  -Will schedule the patient for the OR this Friday if possible for robotic bilateral inguinal hernia repairs.  -The risks and benefits of the procedure were explained detail to the patient. The risks include infection, bleeding, damage to the surrounding structures. Patient verbalized understanding provided consent to undergo the procedure above.  -I discussed in detail with the patient regarding mesh placement.  I explained the different options of repairing the hernia.  If the hernia was repaired primarily without mesh, there is a higher chance of recurrence.  We discussed in detail using synthetic versus biologic mesh.  I explained to the different scenarios in which each mesh would be more appropriate.  Patient was allowed time to ask questions and concerns regarding mesh placement.  The patient is accepting of the use of mesh with the hernia repair.  -Patient will require medical clearance prior to surgery due to his cardiac history            Chief Complaint:     Chief Complaint   Patient presents with    Consult For     Bilateral ing hernia per LV.          History is obtained from the patient    HPI:   Constantino Laws is a 53 year old male who presents to the general surgery team today for evaluation regarding bilateral inguinal hernias.  The patient recently developed swelling over the area.  Patient has no nausea no vomiting.  Passing flatus and bowels.  Patient states that he does have some mild tenderness to the area.  Denies  chest pain shortness of breath.  Patient has a history of aortic valve replaced.  No blood thinners or antiplatelet.          Past Medical History:     Past Medical History:   Diagnosis Date    Aortic valve stenosis               Past Surgical History:     Past Surgical History:   Procedure Laterality Date    AORTIC VALVE SURGERY  07/2020    HERNIA REPAIR  12/12/2013    WISDOM TOOTH EXTRACTION      teenager             Social History:    reports that he has never smoked. He has never been exposed to tobacco smoke. He has never used smokeless tobacco. He reports current alcohol use. He reports that he does not use drugs.           Family History:     Family History   Problem Relation Age of Onset    Hyperlipidemia Mother     No Known Problems Brother     Hyperlipidemia Maternal Grandmother     Hyperlipidemia Maternal Grandfather     Heart Disease Maternal Grandfather               Allergies:   No Known Allergies           Medications:     Prior to Admission medications    Medication Sig Start Date End Date Taking? Authorizing Provider   acetaminophen (TYLENOL) 500 MG tablet [ACETAMINOPHEN (TYLENOL) 500 MG TABLET] Take 500 mg by mouth every 6 (six) hours as needed for pain. 7/31/20  Yes Provider, Historical   aspirin 81 MG EC tablet [ASPIRIN 81 MG EC TABLET] Take 81 mg by mouth daily. 7/31/20  Yes Provider, Historical   cholecalciferol, vitamin D3, 1,000 unit (25 mcg) tablet [CHOLECALCIFEROL, VITAMIN D3, 1,000 UNIT (25 MCG) TABLET] Take 1,000 Units by mouth daily. 7/31/20  Yes Provider, Historical   metoprolol tartrate (LOPRESSOR) 25 MG tablet [METOPROLOL TARTRATE (LOPRESSOR) 25 MG TABLET] Take 25 mg by mouth 2 (two) times a day. 7/31/20  Yes Provider, Historical   multivitamin therapeutic tablet [MULTIVITAMIN THERAPEUTIC TABLET] Take 1 tablet by mouth daily. 7/31/20  Yes Provider, Historical   rosuvastatin (CRESTOR) 40 MG tablet Take 40 mg by mouth daily   Yes Reported, Patient              Review of Systems:   A 12  "point Review of Systems is negative other than noted in the HPI            Physical Exam:   Patient Vitals for the past 24 hrs:   BP Height Weight   10/01/24 1056 (!) 146/78 1.753 m (5' 9\") 110.6 kg (243 lb 14.4 oz)        [unfilled]   Constitutional:   awake, alert, cooperative, no apparent distress, and appears stated age       Eyes:   PERRL, conjunctiva/corneas clear, EOM's intact; no scleral edema or icterus noted        ENT:   Normocephalic, without obvious abnormality, atraumatic, Lips, mucosa, and tongue normal        Hematologic / Lymphatic:   No lymphadenopathy       Lungs:   Normal respiratory effort, no accessory muscle use       Cardiovascular:   Regular rate and rhythm       Abdomen:   Soft, nondistended, nontender to palpation.  Large bilateral inguinal hernias.  The bilateral inguinal hernias are partially reducible       Musculoskeletal:   No obvious swelling, bruising or deformity       Skin:   Skin color and texture normal for patient, no rashes or lesions              Data:         All imaging studies reviewed by me. I reviewed the images, and I agree with very large bilateral inguinal hernias      DO Jj Blakely DO  General Surgeon  [facility]  Surgery [address]  [place] [address]?  Office: [phone]  Employed by - [facility]  Pager: [phone]         "

## 2024-10-02 ENCOUNTER — OFFICE VISIT (OUTPATIENT)
Dept: FAMILY MEDICINE | Facility: CLINIC | Age: 54
End: 2024-10-02
Payer: COMMERCIAL

## 2024-10-02 VITALS
HEART RATE: 60 BPM | HEIGHT: 69 IN | DIASTOLIC BLOOD PRESSURE: 80 MMHG | OXYGEN SATURATION: 96 % | SYSTOLIC BLOOD PRESSURE: 142 MMHG | BODY MASS INDEX: 36.15 KG/M2 | RESPIRATION RATE: 18 BRPM | TEMPERATURE: 98.2 F | WEIGHT: 244.1 LBS

## 2024-10-02 DIAGNOSIS — D68.51 FACTOR V LEIDEN CARRIER (H): ICD-10-CM

## 2024-10-02 DIAGNOSIS — E78.5 HYPERLIPIDEMIA, UNSPECIFIED HYPERLIPIDEMIA TYPE: ICD-10-CM

## 2024-10-02 DIAGNOSIS — K40.20 BILATERAL INGUINAL HERNIA WITHOUT OBSTRUCTION OR GANGRENE, RECURRENCE NOT SPECIFIED: ICD-10-CM

## 2024-10-02 DIAGNOSIS — Z95.2 S/P AORTIC VALVE REPLACEMENT: ICD-10-CM

## 2024-10-02 DIAGNOSIS — Z01.818 PRE-OP EVALUATION: Primary | ICD-10-CM

## 2024-10-02 PROBLEM — Z79.01 ANTICOAGULATION MONITORING, INR RANGE 2-3: Status: RESOLVED | Noted: 2020-07-22 | Resolved: 2024-10-02

## 2024-10-02 LAB
ANION GAP SERPL CALCULATED.3IONS-SCNC: 10 MMOL/L (ref 7–15)
BUN SERPL-MCNC: 14.2 MG/DL (ref 6–20)
CALCIUM SERPL-MCNC: 9.9 MG/DL (ref 8.8–10.4)
CHLORIDE SERPL-SCNC: 103 MMOL/L (ref 98–107)
CREAT SERPL-MCNC: 0.85 MG/DL (ref 0.67–1.17)
EGFRCR SERPLBLD CKD-EPI 2021: >90 ML/MIN/1.73M2
ERYTHROCYTE [DISTWIDTH] IN BLOOD BY AUTOMATED COUNT: 13.1 % (ref 10–15)
GLUCOSE SERPL-MCNC: 91 MG/DL (ref 70–99)
HCO3 SERPL-SCNC: 26 MMOL/L (ref 22–29)
HCT VFR BLD AUTO: 44.5 % (ref 40–53)
HGB BLD-MCNC: 14.8 G/DL (ref 13.3–17.7)
MCH RBC QN AUTO: 30.3 PG (ref 26.5–33)
MCHC RBC AUTO-ENTMCNC: 33.3 G/DL (ref 31.5–36.5)
MCV RBC AUTO: 91 FL (ref 78–100)
PLATELET # BLD AUTO: 230 10E3/UL (ref 150–450)
POTASSIUM SERPL-SCNC: 4.4 MMOL/L (ref 3.4–5.3)
RBC # BLD AUTO: 4.89 10E6/UL (ref 4.4–5.9)
SODIUM SERPL-SCNC: 139 MMOL/L (ref 135–145)
WBC # BLD AUTO: 7.7 10E3/UL (ref 4–11)

## 2024-10-02 PROCEDURE — G2211 COMPLEX E/M VISIT ADD ON: HCPCS | Performed by: FAMILY MEDICINE

## 2024-10-02 PROCEDURE — 36415 COLL VENOUS BLD VENIPUNCTURE: CPT | Performed by: FAMILY MEDICINE

## 2024-10-02 PROCEDURE — 99214 OFFICE O/P EST MOD 30 MIN: CPT | Performed by: FAMILY MEDICINE

## 2024-10-02 PROCEDURE — 93010 ELECTROCARDIOGRAM REPORT: CPT | Performed by: STUDENT IN AN ORGANIZED HEALTH CARE EDUCATION/TRAINING PROGRAM

## 2024-10-02 PROCEDURE — 85027 COMPLETE CBC AUTOMATED: CPT | Performed by: FAMILY MEDICINE

## 2024-10-02 PROCEDURE — 80048 BASIC METABOLIC PNL TOTAL CA: CPT | Performed by: FAMILY MEDICINE

## 2024-10-02 PROCEDURE — 93005 ELECTROCARDIOGRAM TRACING: CPT | Performed by: FAMILY MEDICINE

## 2024-10-02 ASSESSMENT — PAIN SCALES - GENERAL: PAINLEVEL: NO PAIN (0)

## 2024-10-02 NOTE — PROGRESS NOTES
Preoperative Evaluation  [address] [address]  Phone: [phone]  Fax: [phone]  Primary Provider: Meli Santiago MD  Pre-op Performing Provider: Joel West MD  Oct 2, 2024             10/1/2024   Surgical Information   What procedure is being done? bilateral hernias   Facility or Hospital where procedure/surgery will be performed: [facility]   Who is doing the procedure / surgery? Dr. Jj Blake   Date of surgery / procedure: 10/4/2024   Time of surgery / procedure: 9:35 AM   Where do you plan to recover after surgery? at home with family        Fax number for surgical facility: Note does not need to be faxed, will be available electronically in Epic.    Assessment & Plan     The proposed surgical procedure is considered INTERMEDIATE risk.    Pre-op evaluation    - EKG 12-lead, tracing only  - CBC with platelets; Future  - Basic metabolic panel; Future  - CBC with platelets  - Basic metabolic panel    Bilateral inguinal hernia without obstruction or gangrene, recurrence not specified      S/P aortic valve replacement    Hyperlipidemia    Factor V Leiden carrier (H)       - No identified additional risk factors other than previously addressed         Recommendation  Approval given to proceed with proposed procedure, without further diagnostic evaluation.      The longitudinal plan of care for the diagnosis(es)/condition(s) as documented were addressed during this visit. Due to the added complexity in care, I will continue to support Constantino in the subsequent management and with ongoing continuity of care.    Subjective   Constantino is a 53 year old, presenting for the following:  Pre-Op Exam          10/2/2024     9:43 AM   Additional Questions   Roomed by Geoff BEE MA           10/1/2024   Pre-Op Questionnaire   Have you ever had a heart attack or stroke? No   Have you ever had surgery on your heart or blood vessels, such as a stent  placement, a coronary artery bypass, or surgery on an artery in your head, neck, heart, or legs? (!) YES    Do you have chest pain with activity? No   Do you have a history of heart failure? No   Do you currently have a cold, bronchitis or symptoms of other infection? No   Do you have a cough, shortness of breath, or wheezing? No   Do you or anyone in your family have previous history of blood clots? No   Do you or does anyone in your family have a serious bleeding problem such as prolonged bleeding following surgeries or cuts? No   Have you ever had problems with anemia or been told to take iron pills? No   Have you had any abnormal blood loss such as black, tarry or bloody stools? No   Have you ever had a blood transfusion? No   Are you willing to have a blood transfusion if it is medically needed before, during, or after your surgery? Yes   Have you or any of your relatives ever had problems with anesthesia? No   Do you have sleep apnea, excessive snoring or daytime drowsiness? No   Do you have any artifical heart valves or other implanted medical devices like a pacemaker, defibrillator, or continuous glucose monitor? (!) YES   What type of device do you have? aortic valve replacement   [facility]   Do you have artificial joints? (!) YES   Are you allergic to latex? No        Health Care Directive  Patient does not have a Health Care Directive or Living Will: Patient states has Advance Directive and will bring in a copy to clinic.    Preoperative Review of    reviewed - no record of controlled substances prescribed.          Patient Active Problem List    Diagnosis Date Noted    Class 2 severe obesity due to excess calories with serious comorbidity in adult (H) 10/01/2024     Priority: Medium    S/P aortic valve replacement 07/16/2020     Priority: Medium     -Bicuspid aortic valve with aortic stenosis.s/p bioprosthetic aortic valve replacement  "July 15, 2020   -heterozygous factor V Leiden   -Nonobstructive coronary artery disease  -Hyperlipidemia     -- aspirin 81 mg once daily indefinitely  --Endocarditis prophylaxis indefinitely (antibiotics prior to dental procedures)       Factor V Leiden carrier (H) 03/23/2016     Priority: Medium    Hyperlipidemia 07/02/2008     Priority: Medium      Past Medical History:   Diagnosis Date    Aortic valve stenosis     Factor V Leiden carrier (H)     HLD (hyperlipidemia)     Obese      Past Surgical History:   Procedure Laterality Date    AORTIC VALVE SURGERY  07/2020    CARDIAC SURGERY  7/15/2020    HERNIA REPAIR  12/12/2013    ORTHOPEDIC SURGERY      WISDOM TOOTH EXTRACTION      teenager     Current Outpatient Medications   Medication Sig Dispense Refill    aspirin 81 MG EC tablet [ASPIRIN 81 MG EC TABLET] Take 81 mg by mouth daily.      cholecalciferol, vitamin D3, 1,000 unit (25 mcg) tablet [CHOLECALCIFEROL, VITAMIN D3, 1,000 UNIT (25 MCG) TABLET] Take 1,000 Units by mouth daily.      metoprolol tartrate (LOPRESSOR) 25 MG tablet [METOPROLOL TARTRATE (LOPRESSOR) 25 MG TABLET] Take 25 mg by mouth 2 (two) times a day.      multivitamin therapeutic tablet [MULTIVITAMIN THERAPEUTIC TABLET] Take 1 tablet by mouth daily.      rosuvastatin (CRESTOR) 40 MG tablet Take 40 mg by mouth daily      acetaminophen (TYLENOL) 500 MG tablet [ACETAMINOPHEN (TYLENOL) 500 MG TABLET] Take 500 mg by mouth every 6 (six) hours as needed for pain.         No Known Allergies     Social History     Tobacco Use    Smoking status: Never     Passive exposure: Never    Smokeless tobacco: Never   Substance Use Topics    Alcohol use: Yes       History   Drug Use Unknown             Objective    BP (!) 142/80 (BP Location: Right arm, Patient Position: Sitting, Cuff Size: Adult Regular)   Pulse 60   Temp 98.2  F (36.8  C) (Oral)   Resp 18   Ht 1.753 m (5' 9\")   Wt 110.7 kg (244 lb 1.6 oz)   SpO2 96%   BMI 36.05 kg/m     Estimated body mass " "index is 36.05 kg/m  as calculated from the following:    Height as of this encounter: 1.753 m (5' 9\").    Weight as of this encounter: 110.7 kg (244 lb 1.6 oz).  Physical Exam  GENERAL: alert and no distress  HENT: oropharynx clear and oral mucous membranes moist  NECK: no adenopathy, no asymmetry, masses, or scars  RESP: lungs clear to auscultation - no rales, rhonchi or wheezes  CV: regular rate and rhythm, normal S1 S2, no S3 or S4, no murmur, click or rub, no peripheral edema  ABDOMEN: soft, nontender, no hepatosplenomegaly, no masses and bowel sounds normal   (male): bilateral inguinal hernia  MS: no gross musculoskeletal defects noted, no edema  Skin: no rash     Recent Labs   Lab Test 09/25/24  1236      POTASSIUM 4.5   CR 0.89        Diagnostics  Recent Results (from the past 48 hour(s))   EKG 12-lead, tracing only    Collection Time: 10/02/24 10:38 AM   Result Value Ref Range    Systolic Blood Pressure  mmHg    Diastolic Blood Pressure  mmHg    Ventricular Rate 59 BPM    Atrial Rate 59 BPM    AK Interval 218 ms    QRS Duration 88 ms     ms    QTc 433 ms    P Axis 20 degrees    R AXIS 8 degrees    T Axis 7 degrees    Interpretation ECG       Sinus bradycardia with 1st degree A-V block  Otherwise normal ECG  No previous ECGs available     CBC with platelets    Collection Time: 10/02/24 10:46 AM   Result Value Ref Range    WBC Count 7.7 4.0 - 11.0 10e3/uL    RBC Count 4.89 4.40 - 5.90 10e6/uL    Hemoglobin 14.8 13.3 - 17.7 g/dL    Hematocrit 44.5 40.0 - 53.0 %    MCV 91 78 - 100 fL    MCH 30.3 26.5 - 33.0 pg    MCHC 33.3 31.5 - 36.5 g/dL    RDW 13.1 10.0 - 15.0 %    Platelet Count 230 150 - 450 10e3/uL   Basic metabolic panel    Collection Time: 10/02/24 10:46 AM   Result Value Ref Range    Sodium 139 135 - 145 mmol/L    Potassium 4.4 3.4 - 5.3 mmol/L    Chloride 103 98 - 107 mmol/L    Carbon Dioxide (CO2) 26 22 - 29 mmol/L    Anion Gap 10 7 - 15 mmol/L    Urea Nitrogen 14.2 6.0 - 20.0 mg/dL    " Creatinine 0.85 0.67 - 1.17 mg/dL    GFR Estimate >90 >60 mL/min/1.73m2    Calcium 9.9 8.8 - 10.4 mg/dL    Glucose 91 70 - 99 mg/dL          9/25/2024 TTE   1. Normal left ventricular size, mildly increased wall thickness, normal global systolic function, calculated EF of 64 %.    2. Severely enlarged left atrium.    3. The aortic valve is functioning 29 mm Inspiris bioprosthesis AVR, no stenosis and no regurgitation. The aortic valve peak velocity is 2.1 m/s, the peak gradient is 17 mmHg, and the mean gradient is 9 mmHg. The aortic valve area is 3.04 cmÂ  with a dimensionless index of 0.72. The stroke volume index is 56.5 ml/mÂ .    4. The ascending aorta is dilated with a maximal diameter of 4.1 cm.    5. Compared to echo 9/2022 - no significant change.     Revised Cardiac Risk Index (RCRI)  The patient has the following serious cardiovascular risks for perioperative complications:   - No serious cardiac risks = 0 points     RCRI Interpretation: 0 points: Class I (very low risk - 0.4% complication rate)         Signed Electronically by: Joel West MD  A copy of this evaluation report is provided to the requesting physician.

## 2024-10-03 ENCOUNTER — ANESTHESIA EVENT (OUTPATIENT)
Dept: SURGERY | Facility: HOSPITAL | Age: 54
End: 2024-10-03
Payer: COMMERCIAL

## 2024-10-04 ENCOUNTER — HOSPITAL ENCOUNTER (OUTPATIENT)
Facility: HOSPITAL | Age: 54
Discharge: HOME | End: 2024-10-04
Attending: SURGERY | Admitting: SURGERY
Payer: COMMERCIAL

## 2024-10-04 ENCOUNTER — ANESTHESIA (OUTPATIENT)
Dept: SURGERY | Facility: HOSPITAL | Age: 54
End: 2024-10-04
Payer: COMMERCIAL

## 2024-10-04 VITALS
OXYGEN SATURATION: 93 % | BODY MASS INDEX: 35.88 KG/M2 | RESPIRATION RATE: 18 BRPM | HEART RATE: 81 BPM | DIASTOLIC BLOOD PRESSURE: 89 MMHG | WEIGHT: 243 LBS | TEMPERATURE: 97.7 F | SYSTOLIC BLOOD PRESSURE: 146 MMHG

## 2024-10-04 DIAGNOSIS — K40.20 NON-RECURRENT BILATERAL INGUINAL HERNIA WITHOUT OBSTRUCTION OR GANGRENE: Primary | ICD-10-CM

## 2024-10-04 LAB
ATRIAL RATE - MUSE: 59 BPM
DIASTOLIC BLOOD PRESSURE - MUSE: NORMAL MMHG
INTERPRETATION ECG - MUSE: NORMAL
P AXIS - MUSE: 20 DEGREES
PR INTERVAL - MUSE: 218 MS
QRS DURATION - MUSE: 88 MS
QT - MUSE: 438 MS
QTC - MUSE: 433 MS
R AXIS - MUSE: 8 DEGREES
SYSTOLIC BLOOD PRESSURE - MUSE: NORMAL MMHG
T AXIS - MUSE: 7 DEGREES
VENTRICULAR RATE- MUSE: 59 BPM

## 2024-10-04 PROCEDURE — C1781 MESH (IMPLANTABLE): HCPCS | Performed by: SURGERY

## 2024-10-04 PROCEDURE — 49650 LAP ING HERNIA REPAIR INIT: CPT | Performed by: NURSE ANESTHETIST, CERTIFIED REGISTERED

## 2024-10-04 PROCEDURE — 258N000003 HC RX IP 258 OP 636: Performed by: ANESTHESIOLOGY

## 2024-10-04 PROCEDURE — 360N000080 HC SURGERY LEVEL 7, PER MIN: Performed by: SURGERY

## 2024-10-04 PROCEDURE — S2900 ROBOTIC SURGICAL SYSTEM: HCPCS | Performed by: SURGERY

## 2024-10-04 PROCEDURE — 250N000011 HC RX IP 250 OP 636: Performed by: SURGERY

## 2024-10-04 PROCEDURE — 250N000009 HC RX 250: Performed by: SURGERY

## 2024-10-04 PROCEDURE — 710N000012 HC RECOVERY PHASE 2, PER MINUTE: Performed by: SURGERY

## 2024-10-04 PROCEDURE — 250N000025 HC SEVOFLURANE, PER MIN: Performed by: SURGERY

## 2024-10-04 PROCEDURE — 250N000013 HC RX MED GY IP 250 OP 250 PS 637: Performed by: ANESTHESIOLOGY

## 2024-10-04 PROCEDURE — 258N000001 HC RX 258: Performed by: SURGERY

## 2024-10-04 PROCEDURE — 49650 LAP ING HERNIA REPAIR INIT: CPT | Mod: 50 | Performed by: SURGERY

## 2024-10-04 PROCEDURE — 250N000011 HC RX IP 250 OP 636: Performed by: NURSE ANESTHETIST, CERTIFIED REGISTERED

## 2024-10-04 PROCEDURE — 999N000141 HC STATISTIC PRE-PROCEDURE NURSING ASSESSMENT: Performed by: SURGERY

## 2024-10-04 PROCEDURE — 272N000001 HC OR GENERAL SUPPLY STERILE: Performed by: SURGERY

## 2024-10-04 PROCEDURE — 710N000009 HC RECOVERY PHASE 1, LEVEL 1, PER MIN: Performed by: SURGERY

## 2024-10-04 PROCEDURE — 370N000017 HC ANESTHESIA TECHNICAL FEE, PER MIN: Performed by: SURGERY

## 2024-10-04 PROCEDURE — 258N000003 HC RX IP 258 OP 636: Performed by: NURSE ANESTHETIST, CERTIFIED REGISTERED

## 2024-10-04 PROCEDURE — 49650 LAP ING HERNIA REPAIR INIT: CPT | Performed by: ANESTHESIOLOGY

## 2024-10-04 PROCEDURE — 250N000011 HC RX IP 250 OP 636: Performed by: ANESTHESIOLOGY

## 2024-10-04 PROCEDURE — 250N000009 HC RX 250: Performed by: NURSE ANESTHETIST, CERTIFIED REGISTERED

## 2024-10-04 DEVICE — LAPAROSCOPIC SELF-FIXATING MESH POLYESTER WITH POLYLACTIC ACID GRIPS AND COLLAGEN FILM
Type: IMPLANTABLE DEVICE | Site: INGUINAL | Status: FUNCTIONAL
Brand: PROGRIP

## 2024-10-04 RX ORDER — CEFAZOLIN SODIUM/WATER 2 G/20 ML
2 SYRINGE (ML) INTRAVENOUS
Status: COMPLETED | OUTPATIENT
Start: 2024-10-04 | End: 2024-10-04

## 2024-10-04 RX ORDER — LORAZEPAM 2 MG/ML
.5-1 INJECTION INTRAMUSCULAR
Status: DISCONTINUED | OUTPATIENT
Start: 2024-10-04 | End: 2024-10-04 | Stop reason: HOSPADM

## 2024-10-04 RX ORDER — FENTANYL CITRATE 50 UG/ML
25 INJECTION, SOLUTION INTRAMUSCULAR; INTRAVENOUS
Status: DISCONTINUED | OUTPATIENT
Start: 2024-10-04 | End: 2024-10-04 | Stop reason: HOSPADM

## 2024-10-04 RX ORDER — ACETAMINOPHEN 325 MG/1
975 TABLET ORAL
Status: DISCONTINUED | OUTPATIENT
Start: 2024-10-04 | End: 2024-10-04 | Stop reason: HOSPADM

## 2024-10-04 RX ORDER — AMPICILLIN 2 G/1
2 INJECTION, POWDER, FOR SOLUTION INTRAVENOUS ONCE
Status: COMPLETED | OUTPATIENT
Start: 2024-10-04 | End: 2024-10-04

## 2024-10-04 RX ORDER — ONDANSETRON 2 MG/ML
INJECTION INTRAMUSCULAR; INTRAVENOUS PRN
Status: DISCONTINUED | OUTPATIENT
Start: 2024-10-04 | End: 2024-10-04

## 2024-10-04 RX ORDER — HYDROMORPHONE HCL IN WATER/PF 6 MG/30 ML
0.4 PATIENT CONTROLLED ANALGESIA SYRINGE INTRAVENOUS EVERY 5 MIN PRN
Status: DISCONTINUED | OUTPATIENT
Start: 2024-10-04 | End: 2024-10-04 | Stop reason: HOSPADM

## 2024-10-04 RX ORDER — LIDOCAINE HYDROCHLORIDE 10 MG/ML
INJECTION, SOLUTION INFILTRATION; PERINEURAL PRN
Status: DISCONTINUED | OUTPATIENT
Start: 2024-10-04 | End: 2024-10-04

## 2024-10-04 RX ORDER — HYDROCODONE BITARTRATE AND ACETAMINOPHEN 5; 325 MG/1; MG/1
1 TABLET ORAL EVERY 6 HOURS PRN
Qty: 10 TABLET | Refills: 0 | Status: SHIPPED | OUTPATIENT
Start: 2024-10-04 | End: 2024-10-07

## 2024-10-04 RX ORDER — DOCUSATE SODIUM 100 MG/1
100 CAPSULE, LIQUID FILLED ORAL 2 TIMES DAILY
Qty: 30 CAPSULE | Refills: 0 | Status: SHIPPED | OUTPATIENT
Start: 2024-10-04 | End: 2024-10-25

## 2024-10-04 RX ORDER — HYDROMORPHONE HCL IN WATER/PF 6 MG/30 ML
0.2 PATIENT CONTROLLED ANALGESIA SYRINGE INTRAVENOUS EVERY 5 MIN PRN
Status: DISCONTINUED | OUTPATIENT
Start: 2024-10-04 | End: 2024-10-04 | Stop reason: HOSPADM

## 2024-10-04 RX ORDER — SODIUM CHLORIDE, SODIUM LACTATE, POTASSIUM CHLORIDE, CALCIUM CHLORIDE 600; 310; 30; 20 MG/100ML; MG/100ML; MG/100ML; MG/100ML
INJECTION, SOLUTION INTRAVENOUS CONTINUOUS
Status: DISCONTINUED | OUTPATIENT
Start: 2024-10-04 | End: 2024-10-04 | Stop reason: HOSPADM

## 2024-10-04 RX ORDER — LABETALOL HYDROCHLORIDE 5 MG/ML
INJECTION, SOLUTION INTRAVENOUS PRN
Status: DISCONTINUED | OUTPATIENT
Start: 2024-10-04 | End: 2024-10-04

## 2024-10-04 RX ORDER — ONDANSETRON 4 MG/1
4 TABLET, ORALLY DISINTEGRATING ORAL EVERY 30 MIN PRN
Status: DISCONTINUED | OUTPATIENT
Start: 2024-10-04 | End: 2024-10-04 | Stop reason: HOSPADM

## 2024-10-04 RX ORDER — DEXAMETHASONE SODIUM PHOSPHATE 4 MG/ML
4 INJECTION, SOLUTION INTRA-ARTICULAR; INTRALESIONAL; INTRAMUSCULAR; INTRAVENOUS; SOFT TISSUE
Status: DISCONTINUED | OUTPATIENT
Start: 2024-10-04 | End: 2024-10-04 | Stop reason: HOSPADM

## 2024-10-04 RX ORDER — PROPOFOL 10 MG/ML
INJECTION, EMULSION INTRAVENOUS CONTINUOUS PRN
Status: DISCONTINUED | OUTPATIENT
Start: 2024-10-04 | End: 2024-10-04

## 2024-10-04 RX ORDER — NALOXONE HYDROCHLORIDE 0.4 MG/ML
0.1 INJECTION, SOLUTION INTRAMUSCULAR; INTRAVENOUS; SUBCUTANEOUS
Status: DISCONTINUED | OUTPATIENT
Start: 2024-10-04 | End: 2024-10-04 | Stop reason: HOSPADM

## 2024-10-04 RX ORDER — LIDOCAINE 40 MG/G
CREAM TOPICAL
Status: DISCONTINUED | OUTPATIENT
Start: 2024-10-04 | End: 2024-10-04 | Stop reason: HOSPADM

## 2024-10-04 RX ORDER — LIDOCAINE HYDROCHLORIDE AND EPINEPHRINE 10; 10 MG/ML; UG/ML
INJECTION, SOLUTION INFILTRATION; PERINEURAL PRN
Status: DISCONTINUED | OUTPATIENT
Start: 2024-10-04 | End: 2024-10-04 | Stop reason: HOSPADM

## 2024-10-04 RX ORDER — FENTANYL CITRATE 50 UG/ML
50 INJECTION, SOLUTION INTRAMUSCULAR; INTRAVENOUS EVERY 5 MIN PRN
Status: DISCONTINUED | OUTPATIENT
Start: 2024-10-04 | End: 2024-10-04 | Stop reason: HOSPADM

## 2024-10-04 RX ORDER — FENTANYL CITRATE 50 UG/ML
INJECTION, SOLUTION INTRAMUSCULAR; INTRAVENOUS PRN
Status: DISCONTINUED | OUTPATIENT
Start: 2024-10-04 | End: 2024-10-04

## 2024-10-04 RX ORDER — KETAMINE HYDROCHLORIDE 10 MG/ML
INJECTION INTRAMUSCULAR; INTRAVENOUS PRN
Status: DISCONTINUED | OUTPATIENT
Start: 2024-10-04 | End: 2024-10-04

## 2024-10-04 RX ORDER — KETOROLAC TROMETHAMINE 30 MG/ML
INJECTION, SOLUTION INTRAMUSCULAR; INTRAVENOUS PRN
Status: DISCONTINUED | OUTPATIENT
Start: 2024-10-04 | End: 2024-10-04

## 2024-10-04 RX ORDER — GLYCOPYRROLATE 0.2 MG/ML
INJECTION, SOLUTION INTRAMUSCULAR; INTRAVENOUS PRN
Status: DISCONTINUED | OUTPATIENT
Start: 2024-10-04 | End: 2024-10-04

## 2024-10-04 RX ORDER — ONDANSETRON 2 MG/ML
4 INJECTION INTRAMUSCULAR; INTRAVENOUS EVERY 30 MIN PRN
Status: DISCONTINUED | OUTPATIENT
Start: 2024-10-04 | End: 2024-10-04 | Stop reason: HOSPADM

## 2024-10-04 RX ORDER — MAGNESIUM SULFATE 4 G/50ML
4 INJECTION INTRAVENOUS ONCE
Status: COMPLETED | OUTPATIENT
Start: 2024-10-04 | End: 2024-10-04

## 2024-10-04 RX ORDER — OXYCODONE HYDROCHLORIDE 5 MG/1
5 TABLET ORAL EVERY 4 HOURS PRN
Status: DISCONTINUED | OUTPATIENT
Start: 2024-10-04 | End: 2024-10-04 | Stop reason: HOSPADM

## 2024-10-04 RX ORDER — ACETAMINOPHEN 325 MG/1
975 TABLET ORAL ONCE
Status: COMPLETED | OUTPATIENT
Start: 2024-10-04 | End: 2024-10-04

## 2024-10-04 RX ORDER — KETOROLAC TROMETHAMINE 30 MG/ML
15 INJECTION, SOLUTION INTRAMUSCULAR; INTRAVENOUS
Status: DISCONTINUED | OUTPATIENT
Start: 2024-10-04 | End: 2024-10-04 | Stop reason: HOSPADM

## 2024-10-04 RX ORDER — FENTANYL CITRATE 50 UG/ML
25 INJECTION, SOLUTION INTRAMUSCULAR; INTRAVENOUS EVERY 5 MIN PRN
Status: DISCONTINUED | OUTPATIENT
Start: 2024-10-04 | End: 2024-10-04 | Stop reason: HOSPADM

## 2024-10-04 RX ORDER — MEPERIDINE HYDROCHLORIDE 25 MG/ML
12.5 INJECTION INTRAMUSCULAR; INTRAVENOUS; SUBCUTANEOUS EVERY 5 MIN PRN
Status: DISCONTINUED | OUTPATIENT
Start: 2024-10-04 | End: 2024-10-04 | Stop reason: HOSPADM

## 2024-10-04 RX ORDER — DEXAMETHASONE SODIUM PHOSPHATE 10 MG/ML
INJECTION, SOLUTION INTRAMUSCULAR; INTRAVENOUS PRN
Status: DISCONTINUED | OUTPATIENT
Start: 2024-10-04 | End: 2024-10-04

## 2024-10-04 RX ORDER — OXYCODONE HYDROCHLORIDE 5 MG/1
10 TABLET ORAL EVERY 4 HOURS PRN
Status: DISCONTINUED | OUTPATIENT
Start: 2024-10-04 | End: 2024-10-04 | Stop reason: HOSPADM

## 2024-10-04 RX ORDER — CEFAZOLIN SODIUM/WATER 2 G/20 ML
2 SYRINGE (ML) INTRAVENOUS SEE ADMIN INSTRUCTIONS
Status: DISCONTINUED | OUTPATIENT
Start: 2024-10-04 | End: 2024-10-04 | Stop reason: HOSPADM

## 2024-10-04 RX ORDER — PROPOFOL 10 MG/ML
INJECTION, EMULSION INTRAVENOUS PRN
Status: DISCONTINUED | OUTPATIENT
Start: 2024-10-04 | End: 2024-10-04

## 2024-10-04 RX ADMIN — DEXMEDETOMIDINE HYDROCHLORIDE 8 MCG: 100 INJECTION, SOLUTION INTRAVENOUS at 10:22

## 2024-10-04 RX ADMIN — FENTANYL CITRATE 50 MCG: 50 INJECTION INTRAMUSCULAR; INTRAVENOUS at 10:01

## 2024-10-04 RX ADMIN — HYDROMORPHONE HYDROCHLORIDE 0.5 MG: 1 INJECTION, SOLUTION INTRAMUSCULAR; INTRAVENOUS; SUBCUTANEOUS at 12:34

## 2024-10-04 RX ADMIN — LABETALOL HYDROCHLORIDE 5 MG: 5 INJECTION, SOLUTION INTRAVENOUS at 13:46

## 2024-10-04 RX ADMIN — ROCURONIUM BROMIDE 10 MG: 50 INJECTION, SOLUTION INTRAVENOUS at 12:03

## 2024-10-04 RX ADMIN — MAGNESIUM SULFATE HEPTAHYDRATE 4 G: 80 INJECTION, SOLUTION INTRAVENOUS at 09:18

## 2024-10-04 RX ADMIN — DEXMEDETOMIDINE HYDROCHLORIDE 8 MCG: 100 INJECTION, SOLUTION INTRAVENOUS at 10:26

## 2024-10-04 RX ADMIN — DEXMEDETOMIDINE HYDROCHLORIDE 4 MCG: 100 INJECTION, SOLUTION INTRAVENOUS at 10:28

## 2024-10-04 RX ADMIN — ROCURONIUM BROMIDE 20 MG: 50 INJECTION, SOLUTION INTRAVENOUS at 11:02

## 2024-10-04 RX ADMIN — SODIUM CHLORIDE, POTASSIUM CHLORIDE, SODIUM LACTATE AND CALCIUM CHLORIDE: 600; 310; 30; 20 INJECTION, SOLUTION INTRAVENOUS at 09:17

## 2024-10-04 RX ADMIN — AMPICILLIN SODIUM 2 G: 2 INJECTION, POWDER, FOR SOLUTION INTRAMUSCULAR; INTRAVENOUS at 09:30

## 2024-10-04 RX ADMIN — SODIUM CHLORIDE, POTASSIUM CHLORIDE, SODIUM LACTATE AND CALCIUM CHLORIDE: 600; 310; 30; 20 INJECTION, SOLUTION INTRAVENOUS at 11:23

## 2024-10-04 RX ADMIN — PROPOFOL 150 MG: 10 INJECTION, EMULSION INTRAVENOUS at 10:01

## 2024-10-04 RX ADMIN — GLYCOPYRROLATE 0.2 MG: 0.2 INJECTION INTRAMUSCULAR; INTRAVENOUS at 10:19

## 2024-10-04 RX ADMIN — HYDROMORPHONE HYDROCHLORIDE 1 MG: 1 INJECTION, SOLUTION INTRAMUSCULAR; INTRAVENOUS; SUBCUTANEOUS at 10:32

## 2024-10-04 RX ADMIN — ROCURONIUM BROMIDE 20 MG: 50 INJECTION, SOLUTION INTRAVENOUS at 11:33

## 2024-10-04 RX ADMIN — ACETAMINOPHEN 975 MG: 325 TABLET ORAL at 09:16

## 2024-10-04 RX ADMIN — ROCURONIUM BROMIDE 10 MG: 50 INJECTION, SOLUTION INTRAVENOUS at 12:33

## 2024-10-04 RX ADMIN — KETOROLAC TROMETHAMINE 15 MG: 30 INJECTION, SOLUTION INTRAMUSCULAR at 13:20

## 2024-10-04 RX ADMIN — MIDAZOLAM 2 MG: 1 INJECTION INTRAMUSCULAR; INTRAVENOUS at 09:46

## 2024-10-04 RX ADMIN — PROPOFOL 50 MG: 10 INJECTION, EMULSION INTRAVENOUS at 10:26

## 2024-10-04 RX ADMIN — LABETALOL HYDROCHLORIDE 5 MG: 5 INJECTION, SOLUTION INTRAVENOUS at 10:49

## 2024-10-04 RX ADMIN — OXYCODONE HYDROCHLORIDE 5 MG: 5 TABLET ORAL at 14:43

## 2024-10-04 RX ADMIN — LABETALOL HYDROCHLORIDE 5 MG: 5 INJECTION, SOLUTION INTRAVENOUS at 11:22

## 2024-10-04 RX ADMIN — ROCURONIUM BROMIDE 50 MG: 50 INJECTION, SOLUTION INTRAVENOUS at 10:02

## 2024-10-04 RX ADMIN — ROCURONIUM BROMIDE 10 MG: 50 INJECTION, SOLUTION INTRAVENOUS at 12:50

## 2024-10-04 RX ADMIN — LIDOCAINE HYDROCHLORIDE 5 ML: 10 INJECTION, SOLUTION INFILTRATION; PERINEURAL at 10:01

## 2024-10-04 RX ADMIN — DEXAMETHASONE SODIUM PHOSPHATE 10 MG: 10 INJECTION, SOLUTION INTRAMUSCULAR; INTRAVENOUS at 10:15

## 2024-10-04 RX ADMIN — FENTANYL CITRATE 50 MCG: 50 INJECTION INTRAMUSCULAR; INTRAVENOUS at 09:55

## 2024-10-04 RX ADMIN — FENTANYL CITRATE 50 MCG: 50 INJECTION, SOLUTION INTRAMUSCULAR; INTRAVENOUS at 14:18

## 2024-10-04 RX ADMIN — ONDANSETRON 4 MG: 2 INJECTION INTRAMUSCULAR; INTRAVENOUS at 13:19

## 2024-10-04 RX ADMIN — LABETALOL HYDROCHLORIDE 5 MG: 5 INJECTION, SOLUTION INTRAVENOUS at 10:40

## 2024-10-04 RX ADMIN — ROCURONIUM BROMIDE 30 MG: 50 INJECTION, SOLUTION INTRAVENOUS at 10:36

## 2024-10-04 RX ADMIN — PROPOFOL 30 MCG/KG/MIN: 10 INJECTION, EMULSION INTRAVENOUS at 10:07

## 2024-10-04 RX ADMIN — SUGAMMADEX 220 MG: 100 INJECTION, SOLUTION INTRAVENOUS at 13:25

## 2024-10-04 RX ADMIN — HYDROMORPHONE HYDROCHLORIDE 0.5 MG: 1 INJECTION, SOLUTION INTRAMUSCULAR; INTRAVENOUS; SUBCUTANEOUS at 13:10

## 2024-10-04 RX ADMIN — KETAMINE HYDROCHLORIDE 50 MG: 10 INJECTION INTRAMUSCULAR; INTRAVENOUS at 10:02

## 2024-10-04 RX ADMIN — Medication 2 G: at 09:48

## 2024-10-04 ASSESSMENT — ACTIVITIES OF DAILY LIVING (ADL)
ADLS_ACUITY_SCORE: 31
ADLS_ACUITY_SCORE: 21
ADLS_ACUITY_SCORE: 20
ADLS_ACUITY_SCORE: 21
ADLS_ACUITY_SCORE: 20
ADLS_ACUITY_SCORE: 31

## 2024-10-04 NOTE — OP NOTE
"[facility]    Operative Note    Pre-operative diagnosis: Non-recurrent bilateral inguinal hernia without obstruction or gangrene [K40.20]  Post-operative diagnosis Same as pre-operative diagnosis    Procedure: HERNIORRHAPHY, BILATERAL INGUINAL WITH MESH, ROBOT-ASSISTED, LAPAROSCOPIC, Bilateral - Abdomen    Surgeon: Surgeons and Role:     * Jj Blake, DO - Primary  Anesthesia: General   Estimated Blood Loss: 25 mL     Drains: None  Specimens: * No specimens in log *  Findings:     - large bilateral inguinals both containing retroperitoneal fat  -  Resection of the devitalized large retroperitoneal fat.    Complications: None.  Implants:   Implant Name Type Inv. Item Serial No.  Lot No. LRB No. Used Action   MESH PROGRIP LAPAROSCOPIC 5.9X3.9\" PARIETEX SELF-FIX [id number] - SNA Mesh MESH PROGRIP LAPAROSCOPIC 5.9X3.9\" PARIETEX SELF-FIX [id number] NA COVIDIEN [id number] Right 1 Implanted   MESH PROGRIP LAPAROSCOPIC 5.9X3.9\" PARIETEX SELF-FIX [id number] - SNA Mesh MESH PROGRIP LAPAROSCOPIC 5.9X3.9\" PARIETEX SELF-FIX [id number] NA COVIDIEN [id number] Left 1 Implanted     Indication: 53-year-old.  The risks and benefits of the procedure were explained detail to the patient. The risks include infection, bleeding, damage to the surrounding structures. Patient verbalized understanding provided consent to undergo the procedure above. I discussed in detail with the patient regarding mesh placement.  I explained the different options of repairing the hernia.  If the hernia was repaired primarily without mesh, there is a higher chance of recurrence.  We discussed in detail using synthetic versus biologic mesh.  I explained to the different scenarios in which each mesh would be more appropriate.  Patient was allowed time to ask questions and concerns regarding mesh placement.  The patient is accepting of the use of mesh with the hernia repair.      Procedure: Patient was brought to the operating room " placed on operating table in the supine position.  Patient's bilateral upper extremities were padded and tucked in the usual fashion.  Patient underwent sedation and intubation by the anesthesia team.  Patient's abdomen was prepped and draped in usual sterile fashion.  Prior to initiating procedure, a timeout was completed.  All present were in agreement.      1% lidocaine with epinephrine was localized over Cortes's point.  An 11 blade was used to make a pinpoint skin incision to allow the Veress needle for abdominal access.  Once the Veress needle was intra-abdominal insufflation was initiated.  Once sufficient insufflation was obtained, x3 8 mm ports were placed at the same level supraumbilical, right upper quadrant, left upper quadrant.  Ports were placed under direct visualization.  The robot was then docked.    On general survey, the patient had bilateral indirect inguinal hernias.  I could also see that there were no injuries to the abdominal contents upon entry into the abdomen.     The peritoneal flap was then created using a combination of electrocautery as well as sharp dissection with the scissors.  This flap was taken down below Praful's ligament.  The flap was extended the right ASIS all the way to the left ASIS the flap was also extended inferiorly to the pubic symphysis and Praful's ligament.  Once the flap was created, the hernia sac was identified and reduced using a combination of gentle traction and also sharp dissection with the scissors as well as electrocautery.  The patient had a very large bilateral indirect inguinal hernias that contain significant amounts of retroperitoneal fat.  The retroperitoneal fat within the right inguinal hernia was easily reduced.  The left inguinal hernia had scarred down retroperitoneal fat inside the left inguinal canal and scrotum.  This was very difficult to discern the anatomy and to separate the cord structures from the retroperitoneal fat.  Once all of the  retroperitoneal fat was evacuated from the left inguinal hernia, the left-sided cord structures were all identified.  Care was taken to ensure that there was no injury to the cord structures.  During evaluation of the structures, I identified that there was a large venous structure from the peritoneal fat that was exiting through the inner ring.  I skeletonized this vessel and I could identify that this was a terminal vessel into the retroperitoneal fat. If the vessel was not ligated, the retroperitoneal fat would more definitely cause a recurrence of the inguinal hernia given that the vessel would lie underneath the mesh.  Therefore, the large retroperitoneal fat would easily slide back underneath the mesh at its inferior edge causing a recurrence.  Given that this was a terminal vessel into the retroperitoneal fat and not part of the cord structures, I used Weck clips to ligate the structure.    On further examination, the retroperitoneal fat that was reduced was devitalized.  I made a decision to ligate this retroperitoneal fat.  Multiple Weck clips were then used to carefully ligate the smaller vessels within the retroperitoneal fat pad.    x2 10 x 15 cm ProGrip meshes were then inserted and placed in the left and right inguinal region to cover the left and right inguinal hernia.  Once the meshes were in proper position, a 2-0 PDS suture was then used to anchor both meshes to the pubic symphysis and was also used to anchor the left mesh to the anterior abdominal wall to prevent any movement.  A 2-0 V lock stitch was then used to close the peritoneal flap.      The mid supraumbilical incision was then enlarged using hand cautery.  The fascia was opened up in a vertical fashion.  I then grabbed the retroperitoneal fat that was resected.  This fat was removed through the midline incision.  The fascia of the midline incision was then closed using a looped PDS suture in a running fashion.  The trochars were then  removed under direct visualization.  There was no identifiable bleeding from any of the trocar sites.  The trocar sites were then closed using a 4-0 vicryl stitch in a subcuticular fashion.  The surgical incisions were reinforced using surgical glue.      At the end of the procedure a final count was completed.  I was told that all sharps, sponges, instruments were accounted for.  The patient's testicles were then retracted back into the scrotum.  The patient had the sedation reversed and was extubated and brought back to the PACU in stable condition.        Large retroperitoneal fat that herniated through the inguinal canals        Venous structure terminating in the retroperitoneal fat              Jj Blaek DO  General Surgeon  [facility]  Surgery [facility] - [address] [address]?  Office: [phone]  Employed by - [facility]  Pager: [phone]

## 2024-10-04 NOTE — DISCHARGE INSTRUCTIONS
Follow up: Please call us at [phone] to schedule an appointment at your convenience.      If you would prefer to follow up with us by phone please let us know so that we may contact you 2-3 weeks following your procedure.         Diet: Regular diet.  Foods high infiber are recommended with 8 to 10 glasses of fluids each day.     Patients can have difficulty with constipation following surgery, due in part to the administration of narcotic medications.  If you are suffering with constipation, you should avoid foods such as hard cheeses or red meat.      Activity: You should continue to be active at home, including ambulating frequently.  If possible try to limit the amount of time spent in bed.     Restrictions: No heavy lifting more than 5 pounds for the next 6 weeks.     Wound / drain care: Your incisions are closed using absorbale sutures.  The skin is sealed with a surgical glue.  Do not peal the glue off.  Please allow the glue to peal off on its own.      It is normal to have a small rim of red present around the incisions. This should not, however, extend beyond 1/4 inch from the incision.  If your incisions become increasingly tender, red, or draining, please contact us.        Bathing: You may shower after 24 hours from surgery.  It is ok to get your incisions wet, but avoid rubbing them.  Avoid soaking in bath tubs, or swimming in lakes, pools, or streams for 4 weeks following surgery.     No ibuprofen until after 7:30 pm 10/4/2024

## 2024-10-04 NOTE — ANESTHESIA POSTPROCEDURE EVALUATION
Patient: Constantino Laws    Procedure: Procedure(s):  HERNIORRHAPHY, BILATERAL INGUINAL WITH MESH, ROBOT-ASSISTED, LAPAROSCOPIC       Anesthesia Type:  General    Note:  Disposition: Outpatient   Postop Pain Control: Uneventful            Sign Out: Well controlled pain   PONV: No   Neuro/Psych: Uneventful            Sign Out: Acceptable/Baseline neuro status   Airway/Respiratory: Uneventful            Sign Out: Acceptable/Baseline resp. status   CV/Hemodynamics: Uneventful            Sign Out: Acceptable CV status; No obvious hypovolemia; No obvious fluid overload   Other NRE: NONE   DID A NON-ROUTINE EVENT OCCUR? No       Last vitals:  Vitals Value Taken Time   /87 10/04/24 1445   Temp 36.5  C (97.7  F) 10/04/24 1454   Pulse 78 10/04/24 1500   Resp 13 10/04/24 1451   SpO2 94 % 10/04/24 1503   Vitals shown include unfiled device data.    Electronically Signed By: Sohan Oviedo MD  October 4, 2024  3:20 PM

## 2024-10-04 NOTE — ANESTHESIA CARE TRANSFER NOTE
Patient: Constantino Laws    Procedure: Procedure(s):  HERNIORRHAPHY, BILATERAL INGUINAL WITH MESH, ROBOT-ASSISTED, LAPAROSCOPIC       Diagnosis: Non-recurrent bilateral inguinal hernia without obstruction or gangrene [K40.20]  Diagnosis Additional Information: No value filed.    Anesthesia Type:   General     Note:    Oropharynx: oropharynx clear of all foreign objects  Level of Consciousness: awake  Oxygen Supplementation: nasal cannula  Level of Supplemental Oxygen (L/min / FiO2): 3  Independent Airway: airway patency satisfactory and stable  Dentition: dentition unchanged  Vital Signs Stable: post-procedure vital signs reviewed and stable  Report to RN Given: handoff report given  Patient transferred to: PACU    Handoff Report: Identifed the Patient, Identified the Reponsible Provider, Reviewed the pertinent medical history, Discussed the surgical course, Reviewed Intra-OP anesthesia mangement and issues during anesthesia, Set expectations for post-procedure period and Allowed opportunity for questions and acknowledgement of understanding      Vitals:  Vitals Value Taken Time   /81 10/04/24 1401   Temp 36.2  C (97.16  F) 10/04/24 1405   Pulse 78 10/04/24 1405   Resp 12 10/04/24 1405   SpO2 93 % 10/04/24 1405   Vitals shown include unfiled device data.    Electronically Signed By: GENEVA Arcos CRNA  October 4, 2024  2:07 PM

## 2024-10-04 NOTE — ANESTHESIA PREPROCEDURE EVALUATION
Anesthesia Pre-Procedure Evaluation    Patient: Constantino Laws   MRN: [id number] : 1970        Procedure : Procedure(s):  HERNIORRHAPHY, BILATERAL INGUINAL, ROBOT-ASSISTED, LAPAROSCOPIC          Past Medical History:   Diagnosis Date     Aortic valve stenosis      Factor V Leiden carrier (H)      HLD (hyperlipidemia)      Obese       Past Surgical History:   Procedure Laterality Date     AORTIC VALVE SURGERY  2020     CARDIAC SURGERY  7/15/2020     HERNIA REPAIR  2013     ORTHOPEDIC SURGERY       WISDOM TOOTH EXTRACTION      teenager      No Known Allergies   Social History     Tobacco Use     Smoking status: Never     Passive exposure: Never     Smokeless tobacco: Never   Substance Use Topics     Alcohol use: Yes      Wt Readings from Last 1 Encounters:   10/04/24 110.2 kg (243 lb)        Anesthesia Evaluation   Pt has had prior anesthetic. Type: General.    No history of anesthetic complications       ROS/MED HX  ENT/Pulmonary:       Neurologic:  - neg neurologic ROS     Cardiovascular:     (+) Dyslipidemia - -   -  - -                              congenital heart disease (S/P AVR for AS from Bicuspid AoV)        METS/Exercise Tolerance:     Hematologic: Comments: FVL.      Musculoskeletal:  - neg musculoskeletal ROS     GI/Hepatic:  - neg GI/hepatic ROS     Renal/Genitourinary:  - neg Renal ROS     Endo:     (+)               Obesity,       Psychiatric/Substance Use:  - neg psychiatric ROS     Infectious Disease:  - neg infectious disease ROS     Malignancy:  - neg malignancy ROS     Other:  - neg other ROS        Physical Exam    Airway        Mallampati: II   TM distance: > 3 FB   Neck ROM: full   Mouth opening: > 3 cm    Respiratory Devices and Support         Dental       (+) Minor Abnormalities - some fillings, tiny chips      Cardiovascular   cardiovascular exam normal          Pulmonary   pulmonary exam normal            OUTSIDE LABS:  CBC:   Lab Results   Component Value Date    WBC 7.7  "10/02/2024    HGB 14.8 10/02/2024    HCT 44.5 10/02/2024     10/02/2024     BMP:   Lab Results   Component Value Date     10/02/2024     09/25/2024    POTASSIUM 4.4 10/02/2024    POTASSIUM 4.5 09/25/2024    CHLORIDE 103 10/02/2024    CHLORIDE 103 09/25/2024    CO2 26 10/02/2024    CO2 23 09/25/2024    BUN 14.2 10/02/2024    BUN 17.9 09/25/2024    CR 0.85 10/02/2024    CR 0.89 09/25/2024    GLC 91 10/02/2024    GLC 95 09/25/2024     COAGS: No results found for: \"PTT\", \"INR\", \"FIBR\"  POC: No results found for: \"BGM\", \"HCG\", \"HCGS\"  HEPATIC:   Lab Results   Component Value Date    ALBUMIN 4.2 09/25/2024    PROTTOTAL 7.1 09/25/2024    ALT 46 09/25/2024    AST 38 09/25/2024    ALKPHOS 79 09/25/2024    BILITOTAL 0.3 09/25/2024     OTHER:   Lab Results   Component Value Date    NOA 9.9 10/02/2024       Anesthesia Plan    ASA Status:  2    NPO Status:  NPO Appropriate    Anesthesia Type: General.     - Airway: ETT   Induction: Intravenous.   Maintenance: Balanced.        Consents    Anesthesia Plan(s) and associated risks, benefits, and realistic alternatives discussed. Questions answered and patient/representative(s) expressed understanding.     - Discussed:     - Discussed with:  Patient      - Extended Intubation/Ventilatory Support Discussed: No.      - Patient is DNR/DNI Status: No     Use of blood products discussed: No .     Postoperative Care    Pain management: IV analgesics, Oral pain medications, Multi-modal analgesia.   PONV prophylaxis: Ondansetron (or other 5HT-3), Dexamethasone or Solumedrol     Comments:    Other Comments: Decadron, Zofran.  Diprivan infusion.  Toradol, Tylenol.  Ketamine (0.5 mg/kg).  Magnesium.    2 G Ampicillin for SBE prophylaxis.         Sohan Oviedo MD    I have reviewed the pertinent notes and labs in the chart from the past 30 days and (re)examined the patient.  Any updates or changes from those notes are reflected in this note.            # Drug Induced " "Platelet Defect: home medication list includes an antiplatelet medication           # Obesity: Estimated body mass index is 35.88 kg/m  as calculated from the following:    Height as of 10/2/24: 1.753 m (5' 9\").    Weight as of this encounter: 110.2 kg (243 lb).             "

## 2024-10-04 NOTE — H&P
General Surgery H&P  Constantino Laws MRN# [id number]   Age/Sex: 53 year old male YOB: 1970     Reason for visit: Bilateral inguinal hernias       Referring physician: Dr. Navarro                    Assessment and Plan:   Assessment:  Bilateral inguinal hernias  Hx of aortic valve replacement     Plan:  - to the OR for robotic repair of bilateral inguinal hernias.   - The risks and benefits of the procedure were explained detail to the patient. The risks include infection, bleeding, damage to the surrounding structures. Patient verbalized understanding provided consent to undergo the procedure above.            Chief Complaint:   Here for surgery     History is obtained from the patient    HPI:   Constantino Laws is a 53 year old male who presents for bilateral inguinal hernia repairs with mesh.  Patient has no acute changes to medical history.          Past Medical History:     Past Medical History:   Diagnosis Date    Aortic valve stenosis     Factor V Leiden carrier (H)     HLD (hyperlipidemia)     Obese               Past Surgical History:     Past Surgical History:   Procedure Laterality Date    AORTIC VALVE SURGERY  07/2020    CARDIAC SURGERY  7/15/2020    HERNIA REPAIR  12/12/2013    ORTHOPEDIC SURGERY      WISDOM TOOTH EXTRACTION      teenager             Social History:    reports that he has never smoked. He has never been exposed to tobacco smoke. He has never used smokeless tobacco. He reports current alcohol use. He reports that he does not use drugs.           Family History:     Family History   Problem Relation Age of Onset    Hyperlipidemia Mother     No Known Problems Brother     Hyperlipidemia Maternal Grandmother     Hyperlipidemia Maternal Grandfather     Heart Disease Maternal Grandfather               Allergies:   No Known Allergies           Medications:     Prior to Admission medications    Medication Sig Start Date End Date Taking? Authorizing Provider   acetaminophen (TYLENOL) 500  MG tablet [ACETAMINOPHEN (TYLENOL) 500 MG TABLET] Take 500 mg by mouth every 6 (six) hours as needed for pain. 7/31/20   Provider, Historical   aspirin 81 MG EC tablet [ASPIRIN 81 MG EC TABLET] Take 81 mg by mouth daily. 7/31/20   Provider, Historical   cholecalciferol, vitamin D3, 1,000 unit (25 mcg) tablet [CHOLECALCIFEROL, VITAMIN D3, 1,000 UNIT (25 MCG) TABLET] Take 1,000 Units by mouth daily. 7/31/20   Provider, Historical   metoprolol tartrate (LOPRESSOR) 25 MG tablet [METOPROLOL TARTRATE (LOPRESSOR) 25 MG TABLET] Take 25 mg by mouth 2 (two) times a day. 7/31/20   Provider, Historical   multivitamin therapeutic tablet [MULTIVITAMIN THERAPEUTIC TABLET] Take 1 tablet by mouth daily. 7/31/20   Provider, Historical   rosuvastatin (CRESTOR) 40 MG tablet Take 40 mg by mouth daily    Reported, Patient              Review of Systems:   A 12 point Review of Systems is negative other than noted in the HPI            Physical Exam:   No data found.     No intake or output data in the 24 hours ending 10/04/24 0722   Constitutional:   awake, alert, cooperative, no apparent distress, and appears stated age       Eyes:   PERRL, conjunctiva/corneas clear, EOM's intact; no scleral edema or icterus noted        ENT:   Normocephalic, without obvious abnormality, atraumatic, Lips, mucosa, and tongue normal        Hematologic / Lymphatic:   No lymphadenopathy       Lungs:   Normal respiratory effort, no accessory muscle use       Cardiovascular:   Regular rate and rhythm       Abdomen:   Soft, nondistended, nontender to palpation.  Bilateral large inguinal hernias.       Musculoskeletal:   No obvious swelling, bruising or deformity       Skin:   Skin color and texture normal for patient, no rashes or lesions              Data:            Jj Blake DO  General Surgeon  [facility]  Surgery [facility] - [address]  [place] [address]?  Office: [phone]  Employed by - [facility]  [employer] [facility]  Pager: [phone]

## 2024-10-04 NOTE — ANESTHESIA PROCEDURE NOTES
Airway       Patient location during procedure: OR       Procedure Start/Stop Times: 10/4/2024 10:04 AM  Staff -        Anesthesiologist:  Sohan Oviedo MD       CRNA: Alicia Saunders APRN CRNA       Performed By: anesthesiologist  Consent for Airway        Urgency: elective  Indications and Patient Condition       Indications for airway management: sara-procedural       Induction type:intravenous       Mask difficulty assessment: 1 - vent by mask    Final Airway Details       Final airway type: endotracheal airway       Successful airway: Oral and ETT - single  Endotracheal Airway Details        ETT size (mm): 8.0       Cuffed: yes       Successful intubation technique: direct laryngoscopy       DL Blade Type: Mccrary 2       Grade View of Cords: 1       Adjucts: stylet       Position: Right       Measured from: lips       Secured at (cm): 23       Bite block used: None    Post intubation assessment        Placement verified by: capnometry, equal breath sounds and chest rise        Number of attempts at approach: 1       Number of other approaches attempted: 0       Secured with: tape       Ease of procedure: easy       Dentition: Intact and Unchanged       Dental guard used and removed. Dental Guard Type: Standard White.    Medication(s) Administered   Medication Administration Time: 10/4/2024 10:04 AM

## 2024-10-25 ENCOUNTER — OFFICE VISIT (OUTPATIENT)
Dept: SURGERY | Facility: CLINIC | Age: 54
End: 2024-10-25
Payer: COMMERCIAL

## 2024-10-25 ENCOUNTER — VIRTUAL VISIT (OUTPATIENT)
Dept: FAMILY MEDICINE | Facility: CLINIC | Age: 54
End: 2024-10-25
Payer: COMMERCIAL

## 2024-10-25 VITALS — BODY MASS INDEX: 35.83 KG/M2 | HEIGHT: 69 IN | WEIGHT: 241.9 LBS

## 2024-10-25 DIAGNOSIS — Z98.890 POSTOPERATIVE STATE: Primary | ICD-10-CM

## 2024-10-25 DIAGNOSIS — J01.01 ACUTE RECURRENT MAXILLARY SINUSITIS: ICD-10-CM

## 2024-10-25 PROCEDURE — 99024 POSTOP FOLLOW-UP VISIT: CPT | Performed by: SURGERY

## 2024-10-25 PROCEDURE — 99213 OFFICE O/P EST LOW 20 MIN: CPT | Mod: 95 | Performed by: NURSE PRACTITIONER

## 2024-10-25 RX ORDER — AZITHROMYCIN 250 MG/1
TABLET, FILM COATED ORAL
Qty: 6 TABLET | Refills: 2 | Status: SHIPPED | OUTPATIENT
Start: 2024-10-25 | End: 2024-10-30

## 2024-10-25 ASSESSMENT — ENCOUNTER SYMPTOMS: HEADACHES: 1

## 2024-10-25 NOTE — PROGRESS NOTES
General Surgery Clinic - Postop follow up  Constantino Laws MRN# [id number]   Age/Sex: 53 year old male YOB: 1970     Reason for visit: Post op visit                           Assessment and Plan:   Assessment:  1.  Bilateral inguinal hernias    53-year-old male status post robotic assisted laparoscopic repair of bilateral inguinal hernias on 10/4/2024.  Patient is doing well after surgery.    Plan:  -No acute surgical follow-up any more.  Patient can follow-up as needed  -Patient is to continue with 10 pounds of weight lifting restriction for 6 weeks after surgery.  He can incrementally increase 5 pounds thereafter.  -Diet as tolerated          Chief Complaint:     Chief Complaint   Patient presents with    Post-Op - General Surgery      robo inguinal repair - DOS 10/4          History is obtained from the patient    HPI:   Constantino Laws is a 53 year old male who presents general surgery team today for follow-up after having bilateral inguinal hernia repairs done on 10/4/2024.  Patient doing well.  No pain.  Patient passing flatus and bowels.  No further complaints at this time.          Past Medical History:     Past Medical History:   Diagnosis Date    Aortic valve stenosis     Factor V Leiden carrier (H)     HLD (hyperlipidemia)     Obese               Past Surgical History:     Past Surgical History:   Procedure Laterality Date    AORTIC VALVE SURGERY  07/2020    CARDIAC SURGERY  7/15/2020    DAVINCI XI HERNIORRHAPHY INGUINAL Bilateral 10/4/2024    Procedure: HERNIORRHAPHY, BILATERAL INGUINAL WITH MESH, ROBOT-ASSISTED, LAPAROSCOPIC;  Surgeon: Jj Blake DO;  Location: [facility] OR    HERNIA REPAIR  12/12/2013    ORTHOPEDIC SURGERY      WISDOM TOOTH EXTRACTION      teenager             Social History:    reports that he has never smoked. He has never been exposed to tobacco smoke. He has never used smokeless tobacco. He reports current alcohol use. He reports that he does not use drugs.         "   Family History:     Family History   Problem Relation Age of Onset    Hyperlipidemia Mother     No Known Problems Brother     Hyperlipidemia Maternal Grandmother     Hyperlipidemia Maternal Grandfather     Heart Disease Maternal Grandfather               Allergies:   No Known Allergies           Medications:     Prior to Admission medications    Medication Sig Start Date End Date Taking? Authorizing Provider   aspirin 81 MG EC tablet [ASPIRIN 81 MG EC TABLET] Take 81 mg by mouth daily. 7/31/20  Yes Provider, Historical   azithromycin (ZITHROMAX) 250 MG tablet Take 2 tablets (500 mg) by mouth daily for 1 day, THEN 1 tablet (250 mg) daily for 4 days. 10/25/24 10/30/24 Yes Leticia King CNP   cholecalciferol, vitamin D3, 1,000 unit (25 mcg) tablet [CHOLECALCIFEROL, VITAMIN D3, 1,000 UNIT (25 MCG) TABLET] Take 1,000 Units by mouth daily. 7/31/20  Yes Provider, Historical   metoprolol tartrate (LOPRESSOR) 25 MG tablet [METOPROLOL TARTRATE (LOPRESSOR) 25 MG TABLET] Take 25 mg by mouth 2 (two) times a day. 7/31/20  Yes Provider, Historical   multivitamin therapeutic tablet [MULTIVITAMIN THERAPEUTIC TABLET] Take 1 tablet by mouth daily. 7/31/20  Yes Provider, Historical   rosuvastatin (CRESTOR) 40 MG tablet Take 40 mg by mouth daily   Yes Reported, Patient              Review of Systems:   A 12 point Review of Systems is negative other than noted in the HPI            Physical Exam:   Patient Vitals for the past 24 hrs:   Height Weight   10/25/24 1353 1.753 m (5' 9\") 109.7 kg (241 lb 14.4 oz)        [unfilled]   Constitutional:   awake, alert, cooperative, no apparent distress, and appears stated age       Eyes:   PERRL, conjunctiva/corneas clear, EOM's intact; no scleral edema or icterus noted        ENT:   Normocephalic, without obvious abnormality, atraumatic, Lips, mucosa, and tongue normal        Hematologic / Lymphatic:   No lymphadenopathy       Lungs:   Normal respiratory effort, no accessory muscle use "       Cardiovascular:   Regular rate and rhythm       Abdomen:   Soft, nondistended, nontender to palpation.  Surgical incisions well-healed.  Bilateral groins with no significant tenderness to palpation.       Musculoskeletal:   No obvious swelling, bruising or deformity       Skin:   Skin color and texture normal for patient, no rashes or lesions              Data:            DO Jj Blakely DO  General Surgeon  [facility]  Surgery [facility] - [address] [address]?  Office: [phone]  Employed by - [facility]  Pager: [phone]             I, Constantino Laws, give verbal consent for a resident to be present in today's visit.     Amelie Valderrama CMA  She/Her      [facility]  Surgery [facility]  [facility] - [address] [address]    Office: [phone]  Fax: [phone]

## 2024-10-25 NOTE — LETTER
10/25/2024      Constantino Laws  [address] [facility] [address]      Dear Colleague,    Thank you for referring your patient, Constantino Laws, to the [facility] [place]. Please see a copy of my visit note below.    General Surgery Clinic - Postop follow up  Constantino Laws MRN# [id number]   Age/Sex: 53 year old male YOB: 1970     Reason for visit: Post op visit                           Assessment and Plan:   Assessment:  1.  Bilateral inguinal hernias    53-year-old male status post robotic assisted laparoscopic repair of bilateral inguinal hernias on 10/4/2024.  Patient is doing well after surgery.    Plan:  -No acute surgical follow-up any more.  Patient can follow-up as needed  -Patient is to continue with 10 pounds of weight lifting restriction for 6 weeks after surgery.  He can incrementally increase 5 pounds thereafter.  -Diet as tolerated          Chief Complaint:     Chief Complaint   Patient presents with     Post-Op - General Surgery      robo inguinal repair - DOS 10/4          History is obtained from the patient    HPI:   Constantino Laws is a 53 year old male who presents general surgery team today for follow-up after having bilateral inguinal hernia repairs done on 10/4/2024.  Patient doing well.  No pain.  Patient passing flatus and bowels.  No further complaints at this time.          Past Medical History:     Past Medical History:   Diagnosis Date     Aortic valve stenosis      Factor V Leiden carrier (H)      HLD (hyperlipidemia)      Obese               Past Surgical History:     Past Surgical History:   Procedure Laterality Date     AORTIC VALVE SURGERY  07/2020     CARDIAC SURGERY  7/15/2020     DAVINCI XI HERNIORRHAPHY INGUINAL Bilateral 10/4/2024    Procedure: HERNIORRHAPHY, BILATERAL INGUINAL WITH MESH, ROBOT-ASSISTED, LAPAROSCOPIC;  Surgeon: Jj Blake DO;  Location: [facility] OR     HERNIA REPAIR  12/12/2013  "    ORTHOPEDIC SURGERY       WISDOM TOOTH EXTRACTION      teenager             Social History:    reports that he has never smoked. He has never been exposed to tobacco smoke. He has never used smokeless tobacco. He reports current alcohol use. He reports that he does not use drugs.           Family History:     Family History   Problem Relation Age of Onset     Hyperlipidemia Mother      No Known Problems Brother      Hyperlipidemia Maternal Grandmother      Hyperlipidemia Maternal Grandfather      Heart Disease Maternal Grandfather               Allergies:   No Known Allergies           Medications:     Prior to Admission medications    Medication Sig Start Date End Date Taking? Authorizing Provider   aspirin 81 MG EC tablet [ASPIRIN 81 MG EC TABLET] Take 81 mg by mouth daily. 7/31/20  Yes Provider, Historical   azithromycin (ZITHROMAX) 250 MG tablet Take 2 tablets (500 mg) by mouth daily for 1 day, THEN 1 tablet (250 mg) daily for 4 days. 10/25/24 10/30/24 Yes Leticia King CNP   cholecalciferol, vitamin D3, 1,000 unit (25 mcg) tablet [CHOLECALCIFEROL, VITAMIN D3, 1,000 UNIT (25 MCG) TABLET] Take 1,000 Units by mouth daily. 7/31/20  Yes Provider, Historical   metoprolol tartrate (LOPRESSOR) 25 MG tablet [METOPROLOL TARTRATE (LOPRESSOR) 25 MG TABLET] Take 25 mg by mouth 2 (two) times a day. 7/31/20  Yes Provider, Historical   multivitamin therapeutic tablet [MULTIVITAMIN THERAPEUTIC TABLET] Take 1 tablet by mouth daily. 7/31/20  Yes Provider, Historical   rosuvastatin (CRESTOR) 40 MG tablet Take 40 mg by mouth daily   Yes Reported, Patient              Review of Systems:   A 12 point Review of Systems is negative other than noted in the HPI            Physical Exam:   Patient Vitals for the past 24 hrs:   Height Weight   10/25/24 1353 1.753 m (5' 9\") 109.7 kg (241 lb 14.4 oz)        [unfilled]   Constitutional:   awake, alert, cooperative, no apparent distress, and appears stated age       Eyes:   PERRL, " conjunctiva/corneas clear, EOM's intact; no scleral edema or icterus noted        ENT:   Normocephalic, without obvious abnormality, atraumatic, Lips, mucosa, and tongue normal        Hematologic / Lymphatic:   No lymphadenopathy       Lungs:   Normal respiratory effort, no accessory muscle use       Cardiovascular:   Regular rate and rhythm       Abdomen:   Soft, nondistended, nontender to palpation.  Surgical incisions well-healed.  Bilateral groins with no significant tenderness to palpation.       Musculoskeletal:   No obvious swelling, bruising or deformity       Skin:   Skin color and texture normal for patient, no rashes or lesions              Data:            DO Jj Blakely DO  General Surgeon  [facility]  Surgery [facility] - [address] [address]?  Office: [phone]  Employed by [facility]  Pager: [phone]             I, Constantino Laws, give verbal consent for a resident to be present in today's visit.     Amelie Valderrama CMA  She/Her      [facility]  Surgery [facility]  [facility] - [address] [address]    Office: [phone]  Fax: [phone]         Again, thank you for allowing me to participate in the care of your patient.        Sincerely,        Jj Blake DO

## 2024-10-25 NOTE — PROGRESS NOTES
Constantino is a 53 year old who is being evaluated via a billable video visit.    How would you like to obtain your AVS? MyChart  If the video visit is dropped, the invitation should be resent by: Text to cell phone: [phone]  Will anyone else be joining your video visit? No        Subjective   Constantino is a 53 year old, presenting for the following health issues:  URI (Sinus Infection - Symptoms, no fever, yes facial pain, ear pressure, only with flying, no sore throat. Travel a lot, in fall gets sick)      10/25/2024    10:42 AM   Additional Questions   [facility]     URI  This is a recurrent problem. The current episode started in the past 7 days. The problem occurs constantly. The problem has been unchanged. Associated symptoms include congestion and headaches. Nothing aggravates the symptoms. He has tried nothing for the symptoms. The treatment provided no relief.   History of Present Illness       Reason for visit:  Sinus infection  Symptom onset:  Today  Symptoms include:  Sinus Pressure  Symptom intensity:  Mild  Symptom progression:  Staying the same  Had these symptoms before:  No   He is taking medications regularly.       Acute Illness  Acute illness concerns: sinus infection  Onset/Duration: 1 week  Symptoms:  Fever: No  Chills/Sweats: No  Headache (location?): YES  Sinus Pressure: YES  Conjunctivitis:  No  Ear Pain: YES: bilateral  Rhinorrhea: YES  Congestion: YES  Sore Throat: No  Cough: no  Wheeze: No  Decreased Appetite: No  Nausea: No  Vomiting: No  Diarrhea: No  Dysuria/Freq.: No  Dysuria or Hematuria: No  Fatigue/Achiness: No  Sick/Strep Exposure: No  Therapies tried and outcome: None        Objective    Vitals - Patient Reported  Pain Score: No Pain (0)        Physical Exam   GENERAL: alert and no distress  EYES: Eyes grossly normal to inspection.  No discharge or erythema, or obvious scleral/conjunctival abnormalities.  RESP: No audible wheeze, cough, or visible cyanosis.    SKIN: Visible skin  clear. No significant rash, abnormal pigmentation or lesions.  NEURO: Cranial nerves grossly intact.  Mentation and speech appropriate for age.  PSYCH: Appropriate affect, tone, and pace of words    A/P:  1. Acute recurrent maxillary sinusitis  - azithromycin (ZITHROMAX) 250 MG tablet; Take 2 tablets (500 mg) by mouth daily for 1 day, THEN 1 tablet (250 mg) daily for 4 days.  Dispense: 6 tablet; Refill: 2        Video-Visit Details    Type of service:  Video Visit   Originating Location (pt. Location): Home    Distant Location (provider location):  On-site  Platform used for Video Visit: Alec  Signed Electronically by: Leticia King CNP

## 2024-12-02 ENCOUNTER — MYC MEDICAL ADVICE (OUTPATIENT)
Dept: SURGERY | Facility: CLINIC | Age: 54
End: 2024-12-02
Payer: COMMERCIAL

## 2025-01-18 ENCOUNTER — HEALTH MAINTENANCE LETTER (OUTPATIENT)
Age: 55
End: 2025-01-18

## 2025-03-19 ENCOUNTER — MYC MEDICAL ADVICE (OUTPATIENT)
Dept: SURGERY | Facility: CLINIC | Age: 55
End: 2025-03-19
Payer: COMMERCIAL

## 2025-03-25 ENCOUNTER — HOSPITAL ENCOUNTER (OUTPATIENT)
Dept: ULTRASOUND IMAGING | Facility: HOSPITAL | Age: 55
Discharge: HOME | End: 2025-03-25
Attending: SURGERY | Admitting: SURGERY
Payer: COMMERCIAL

## 2025-03-25 ENCOUNTER — OFFICE VISIT (OUTPATIENT)
Dept: SURGERY | Facility: CLINIC | Age: 55
End: 2025-03-25
Payer: COMMERCIAL

## 2025-03-25 VITALS — HEIGHT: 69 IN | WEIGHT: 242 LBS | BODY MASS INDEX: 35.84 KG/M2

## 2025-03-25 DIAGNOSIS — K40.90 RIGHT GROIN HERNIA: Primary | ICD-10-CM

## 2025-03-25 DIAGNOSIS — K40.90 RIGHT GROIN HERNIA: ICD-10-CM

## 2025-03-25 PROCEDURE — 76705 ECHO EXAM OF ABDOMEN: CPT

## 2025-03-25 PROCEDURE — 99213 OFFICE O/P EST LOW 20 MIN: CPT | Performed by: SURGERY

## 2025-03-25 RX ORDER — METOPROLOL SUCCINATE 25 MG/1
TABLET, EXTENDED RELEASE ORAL
COMMUNITY
Start: 2025-02-13

## 2025-03-25 NOTE — PROGRESS NOTES
General Surgery H&P  Constantino Laws MRN# [id number]   Age/Sex: 54 year old male YOB: 1970     Reason for visit: Right groin swelling       Referring physician: Dr. Santiago                    Assessment and Plan:   Assessment:   Right groin swelling  Morbid obesity    54-year-old male status post right-sided inguinal hernia repair done in the past now presenting with some swelling of the right groin again.  It appears that the patient may have redeveloped a right groin inguinal hernia.     Plan:  -Ordering for an ultrasound to evaluate if the patient has redeveloped a right groin hernia.  If the patient has a recurrence of right groin hernia, we can proceed with robotic repair of the right-sided groin hernia.    -Given the patient's obesity, I also recommended some modifications for weight loss.  If the patient was interested, we could also for the patient to the bariatrics team to discuss possible medical versus surgical weight loss management options.          Chief Complaint:     Chief Complaint   Patient presents with    Hernia     Follow-up after previous surg 10/4, now has bulging in same area        History is obtained from the patient    HPI:   Constantino Laws is a 54 year old male who presents to the general surgery team for evaluation of right groin swelling  The patient states that he recently was pushing a car out of snow.  He noticed that there have been some swelling in the right groin.  The swelling though has improved and gone down.  Patient does not have any pain.  He has no fevers no chills.  Passing flatus having bowel once.  No nausea no vomiting.  Patient is known to me.  He had bilateral inguinal hernia repairs done in October 2024.          Past Medical History:     Past Medical History:   Diagnosis Date    Aortic valve stenosis     Factor V Leiden carrier     HLD (hyperlipidemia)     Obese               Past Surgical History:     Past Surgical History:   Procedure Laterality Date  "   AORTIC VALVE SURGERY  07/2020    CARDIAC SURGERY  7/15/2020    DAVADELAIDAI XI HERNIORRHAPHY INGUINAL Bilateral 10/4/2024    Procedure: HERNIORRHAPHY, BILATERAL INGUINAL WITH MESH, ROBOT-ASSISTED, LAPAROSCOPIC;  Surgeon: Jj Blake DO;  Location: [facility] OR    HERNIA REPAIR  12/12/2013    ORTHOPEDIC SURGERY      WISDOM TOOTH EXTRACTION      teenager             Social History:    reports that he has never smoked. He has never been exposed to tobacco smoke. He has never used smokeless tobacco. He reports current alcohol use. He reports that he does not use drugs.           Family History:     Family History   Problem Relation Age of Onset    Hyperlipidemia Mother     No Known Problems Brother     Hyperlipidemia Maternal Grandmother     Hyperlipidemia Maternal Grandfather     Heart Disease Maternal Grandfather               Allergies:   No Known Allergies           Medications:     Prior to Admission medications    Medication Sig Start Date End Date Taking? Authorizing Provider   aspirin 81 MG EC tablet [ASPIRIN 81 MG EC TABLET] Take 81 mg by mouth daily. 7/31/20  Yes Provider, Historical   cholecalciferol, vitamin D3, 1,000 unit (25 mcg) tablet [CHOLECALCIFEROL, VITAMIN D3, 1,000 UNIT (25 MCG) TABLET] Take 1,000 Units by mouth daily. 7/31/20  Yes Provider, Historical   metoprolol succinate ER (TOPROL XL) 25 MG 24 hr tablet  2/13/25  Yes Reported, Patient   multivitamin therapeutic tablet [MULTIVITAMIN THERAPEUTIC TABLET] Take 1 tablet by mouth daily. 7/31/20  Yes Provider, Historical   rosuvastatin (CRESTOR) 40 MG tablet Take 40 mg by mouth daily   Yes Reported, Patient              Review of Systems:   A 12 point Review of Systems is negative other than noted in the HPI            Physical Exam:   Patient Vitals for the past 24 hrs:   Height Weight   03/25/25 1516 1.753 m (5' 9\") 109.8 kg (242 lb)        [unfilled]   Constitutional:   awake, alert, cooperative, no apparent distress, and appears stated " age       Eyes:   PERRL, conjunctiva/corneas clear, EOM's intact; no scleral edema or icterus noted        ENT:   Normocephalic, without obvious abnormality, atraumatic, Lips, mucosa, and tongue normal        Hematologic / Lymphatic:   No lymphadenopathy       Lungs:   Normal respiratory effort, no accessory muscle use       Cardiovascular:   Regular rate and rhythm       Abdomen:   Soft, nondistended, nontender to palpation.  Right groin with some swelling.  It appears to be a reducible recurrence of the right inguinal hernia.       Musculoskeletal:   No obvious swelling, bruising or deformity       Skin:   Skin color and texture normal for patient, no rashes or lesions              Data:              DO Jj Blakely DO  General Surgeon  [facility]  Surgery [facility] - [address] [address]?  Office: [phone]  Employed by - Health system  Pager: [phone]

## 2025-03-25 NOTE — LETTER
3/25/2025      Constantino Laws  [address] [facility] [address]      Dear Colleague,    Thank you for referring your patient, Constantino Laws, to the [facility] [place]. Please see a copy of my visit note below.    General Surgery H&P  Constantino Laws MRN# [id number]   Age/Sex: 54 year old male YOB: 1970     Reason for visit: Right groin swelling       Referring physician: Dr. Santiago                    Assessment and Plan:   Assessment:   Right groin swelling  Morbid obesity    54-year-old male status post right-sided inguinal hernia repair done in the past now presenting with some swelling of the right groin again.  It appears that the patient may have redeveloped a right groin inguinal hernia.     Plan:  -Ordering for an ultrasound to evaluate if the patient has redeveloped a right groin hernia.  If the patient has a recurrence of right groin hernia, we can proceed with robotic repair of the right-sided groin hernia.    -Given the patient's obesity, I also recommended some modifications for weight loss.  If the patient was interested, we could also for the patient to the bariatrics team to discuss possible medical versus surgical weight loss management options.          Chief Complaint:     Chief Complaint   Patient presents with     Hernia     Follow-up after previous surg 10/4, now has bulging in same area        History is obtained from the patient    HPI:   Constantino Laws is a 54 year old male who presents to the general surgery team for evaluation of right groin swelling  The patient states that he recently was pushing a car out of snow.  He noticed that there have been some swelling in the right groin.  The swelling though has improved and gone down.  Patient does not have any pain.  He has no fevers no chills.  Passing flatus having bowel once.  No nausea no vomiting.  Patient is known to me.  He had bilateral inguinal hernia  repairs done in October 2024.          Past Medical History:     Past Medical History:   Diagnosis Date     Aortic valve stenosis      Factor V Leiden carrier      HLD (hyperlipidemia)      Obese               Past Surgical History:     Past Surgical History:   Procedure Laterality Date     AORTIC VALVE SURGERY  07/2020     CARDIAC SURGERY  7/15/2020     DAVINCI XI HERNIORRHAPHY INGUINAL Bilateral 10/4/2024    Procedure: HERNIORRHAPHY, BILATERAL INGUINAL WITH MESH, ROBOT-ASSISTED, LAPAROSCOPIC;  Surgeon: Jj Blake DO;  Location: [facility] OR     HERNIA REPAIR  12/12/2013     ORTHOPEDIC SURGERY       WISDOM TOOTH EXTRACTION      teenager             Social History:    reports that he has never smoked. He has never been exposed to tobacco smoke. He has never used smokeless tobacco. He reports current alcohol use. He reports that he does not use drugs.           Family History:     Family History   Problem Relation Age of Onset     Hyperlipidemia Mother      No Known Problems Brother      Hyperlipidemia Maternal Grandmother      Hyperlipidemia Maternal Grandfather      Heart Disease Maternal Grandfather               Allergies:   No Known Allergies           Medications:     Prior to Admission medications    Medication Sig Start Date End Date Taking? Authorizing Provider   aspirin 81 MG EC tablet [ASPIRIN 81 MG EC TABLET] Take 81 mg by mouth daily. 7/31/20  Yes Provider, Historical   cholecalciferol, vitamin D3, 1,000 unit (25 mcg) tablet [CHOLECALCIFEROL, VITAMIN D3, 1,000 UNIT (25 MCG) TABLET] Take 1,000 Units by mouth daily. 7/31/20  Yes Provider, Historical   metoprolol succinate ER (TOPROL XL) 25 MG 24 hr tablet  2/13/25  Yes Reported, Patient   multivitamin therapeutic tablet [MULTIVITAMIN THERAPEUTIC TABLET] Take 1 tablet by mouth daily. 7/31/20  Yes Provider, Historical   rosuvastatin (CRESTOR) 40 MG tablet Take 40 mg by mouth daily   Yes Reported, Patient              Review of Systems:   A 12 point  "Review of Systems is negative other than noted in the HPI            Physical Exam:   Patient Vitals for the past 24 hrs:   Height Weight   03/25/25 1516 1.753 m (5' 9\") 109.8 kg (242 lb)        [unfilled]   Constitutional:   awake, alert, cooperative, no apparent distress, and appears stated age       Eyes:   PERRL, conjunctiva/corneas clear, EOM's intact; no scleral edema or icterus noted        ENT:   Normocephalic, without obvious abnormality, atraumatic, Lips, mucosa, and tongue normal        Hematologic / Lymphatic:   No lymphadenopathy       Lungs:   Normal respiratory effort, no accessory muscle use       Cardiovascular:   Regular rate and rhythm       Abdomen:   Soft, nondistended, nontender to palpation.  Right groin with some swelling.  It appears to be a reducible recurrence of the right inguinal hernia.       Musculoskeletal:   No obvious swelling, bruising or deformity       Skin:   Skin color and texture normal for patient, no rashes or lesions              Data:              DO Jj Blakely DO  General Surgeon  [facility]  Surgery [address] [address]?  Office: [phone]  Employed by - [facility]  Pager: [phone]       Again, thank you for allowing me to participate in the care of your patient.        Sincerely,        Jj Blake DO    Electronically signed"

## 2025-04-01 ENCOUNTER — VIRTUAL VISIT (OUTPATIENT)
Dept: FAMILY MEDICINE | Facility: CLINIC | Age: 55
End: 2025-04-01
Payer: COMMERCIAL

## 2025-04-01 DIAGNOSIS — E66.812 CLASS 2 SEVERE OBESITY DUE TO EXCESS CALORIES WITH SERIOUS COMORBIDITY AND BODY MASS INDEX (BMI) OF 35.0 TO 35.9 IN ADULT (H): Primary | ICD-10-CM

## 2025-04-01 DIAGNOSIS — J01.01 ACUTE RECURRENT MAXILLARY SINUSITIS: ICD-10-CM

## 2025-04-01 DIAGNOSIS — E66.01 CLASS 2 SEVERE OBESITY DUE TO EXCESS CALORIES WITH SERIOUS COMORBIDITY AND BODY MASS INDEX (BMI) OF 35.0 TO 35.9 IN ADULT (H): Primary | ICD-10-CM

## 2025-04-01 PROCEDURE — 1126F AMNT PAIN NOTED NONE PRSNT: CPT | Mod: 95 | Performed by: NURSE PRACTITIONER

## 2025-04-01 PROCEDURE — 98005 SYNCH AUDIO-VIDEO EST LOW 20: CPT | Performed by: NURSE PRACTITIONER

## 2025-04-01 RX ORDER — AZITHROMYCIN 250 MG/1
TABLET, FILM COATED ORAL
Qty: 6 TABLET | Refills: 3 | Status: SHIPPED | OUTPATIENT
Start: 2025-04-01 | End: 2025-04-06

## 2025-04-01 NOTE — PROGRESS NOTES
"Constantino is a 54 year old who is being evaluated via a billable video visit.    How would you like to obtain your AVS? MyChart  If the video visit is dropped, the invitation should be resent by: Text to cell phone: [phone]  Will anyone else be joining your video visit? No        Subjective   Constantino is a 54 year old, presenting for the following health issues:  Sinus Problem (Pt thinks he has another sinus infection. Pt states this has been bothering him for a couple days. Pt is flying on Sunday and is hoping to have an antibiotic before then. )      4/1/2025    11:37 AM   Additional Questions   Roomed by Erika BOYCE CMA     Sinus Problem     History of Present Illness       Reason for visit:  Sinus  Symptom onset:  3-7 days ago  Symptom intensity:  Mild  Symptom progression:  Staying the same  Had these symptoms before:  Yes  Has tried/received treatment for these symptoms:  Yes  Previous treatment was successful:  Yes  Prior treatment description:  Antibiotic   He is taking medications regularly.          Acute Illness  Acute illness concerns: sinus infection  Onset/Duration: x 5 days  Symptoms:  Fever: No  Chills/Sweats: No  Headache (location?): YES  Sinus Pressure: YES  Conjunctivitis:  No  Ear Pain: no  Rhinorrhea: YES  Congestion: YES  Sore Throat: No  Cough: no  Wheeze: No  Decreased Appetite: No  Nausea: No  Vomiting: No  Diarrhea: No  Dysuria/Freq.: No  Dysuria or Hematuria: No  Fatigue/Achiness: No  Sick/Strep Exposure: No  Therapies tried and outcome: OTC cold medications    Also would like to discuss weight loss medication options.       Objective    Vitals - Patient Reported  Weight (Patient Reported): 104.3 kg (230 lb)  Height (Patient Reported): 175.3 cm (5' 9\")  BMI (Based on Pt Reported Ht/Wt): 33.96  Pain Score: No Pain (0)        Physical Exam   GENERAL: alert and no distress  EYES: Eyes grossly normal to inspection.  No discharge or erythema, or obvious scleral/conjunctival abnormalities.  RESP: No " audible wheeze, cough, or visible cyanosis.    SKIN: Visible skin clear. No significant rash, abnormal pigmentation or lesions.  NEURO: Cranial nerves grossly intact.  Mentation and speech appropriate for age.  PSYCH: Appropriate affect, tone, and pace of words    Lab results reviewed in Epic    A/P:  1. Acute recurrent maxillary sinusitis  - azithromycin (ZITHROMAX) 250 MG tablet; Take 2 tablets (500 mg) by mouth daily for 1 day, THEN 1 tablet (250 mg) daily for 4 days.  Dispense: 6 tablet; Refill: 3    2. Class 2 severe obesity due to excess calories with serious comorbidity and body mass index (BMI) of 35.0 to 35.9 in adult (H) (Primary)  Discussed weight loss options.  Patient will check insurance coverage.           Video-Visit Details    Type of service:  Video Visit   Originating Location (pt. Location): Home    Distant Location (provider location):  On-site  Platform used for Video Visit: Alec  Signed Electronically by: Leticia King CNP

## 2025-04-08 ENCOUNTER — TELEPHONE (OUTPATIENT)
Dept: FAMILY MEDICINE | Facility: CLINIC | Age: 55
End: 2025-04-08
Payer: COMMERCIAL

## 2025-04-08 DIAGNOSIS — K40.90 RIGHT GROIN HERNIA: ICD-10-CM

## 2025-04-08 DIAGNOSIS — Q23.1 CONGENITAL INSUFFICIENCY OF AORTIC VALVE: ICD-10-CM

## 2025-04-08 DIAGNOSIS — E66.01 MORBID OBESITY (H): Primary | ICD-10-CM

## 2025-04-08 DIAGNOSIS — I25.10 NONOBSTRUCTIVE ATHEROSCLEROSIS OF CORONARY ARTERY: ICD-10-CM

## 2025-04-08 DIAGNOSIS — E78.2 MIXED HYPERLIPIDEMIA: ICD-10-CM

## 2025-04-08 NOTE — TELEPHONE ENCOUNTER
Needs prior authorization for Zepbound.      [employer] prior authorization department: [phone]    Rxs sent to pharmacy.

## 2025-04-08 NOTE — TELEPHONE ENCOUNTER
Prior Authorization Retail Medication Request    Medication/Dose: tirzepatide-Weight Management (ZEPBOUND) 2.5 MG/0.5ML prefilled pen   Diagnosis and ICD code (if different than what is on RX):    Nonobstructive atherosclerosis of coronary artery 2. Mixed hyperlipidemia 3. Right groin hernia 4. Morbid obesity (H) 5. Congenital insufficiency of aortic valve     New/renewal/insurance change PA/secondary ins. PA:  Previously Tried and Failed:  see chart  Rationale:  see chart    Insurance   Primary: [facility]  Insurance ID:  [id number]     Pharmacy Information (if different than what is on RX)  [id number] - [place], [place] - [address] AT [facility] [address]     Cover my meds key: [id number]    Clinic Information  Preferred routing pool for dept communication: NATASHA [facility]

## 2025-04-09 NOTE — TELEPHONE ENCOUNTER
Retail Pharmacy Prior Authorization Team   Phone: [phone]    PA Initiation    Medication: ZEPBOUND 2.5 MG/0.5ML SC SOAJ  Insurance Company: [employer] ([facility]) - Phone [phone] Fax [phone]  Pharmacy Filling the Rx: [id number] [address] AT [facility] [address]  Filling Pharmacy Phone: [phone]  Filling Pharmacy Fax:    Start Date: 4/9/2025    [id number]       Walk in

## 2025-04-10 NOTE — TELEPHONE ENCOUNTER
PRIOR AUTHORIZATION DENIED    Medication: ZEPBOUND 2.5 MG/0.5ML SC SOAJ  Insurance Company: [employer] ([facility]) - Phone [phone] Fax [phone]  Denial Date: 4/9/2025  Denial Reason(s):             Appeal Information:         Patient Notified: No

## 2025-07-10 ENCOUNTER — TELEPHONE (OUTPATIENT)
Dept: FAMILY MEDICINE | Facility: CLINIC | Age: 55
End: 2025-07-10
Payer: COMMERCIAL

## 2025-07-10 NOTE — TELEPHONE ENCOUNTER
Patient Quality Outreach    Patient is due for the following:   IVD  -  Aspirin, LDL (Fasting), Statin, BP Check, and IVD Follow-up Visit  Physical Preventive Adult Physical    Action(s) Taken:   Schedule a Adult Preventative    Type of outreach:    Sent [employer] message.    Questions for provider review:    None         Mary Echevarria, LYRIC  Chart routed to None.

## (undated) DEVICE — NEEDLE HYPO 18X1-1/2 SAFETY 305918

## (undated) DEVICE — DAVINCI XI SEAL UNIVERSAL 5-12MM 470500

## (undated) DEVICE — SYR 50ML SLIP TIP W/O NDL 309654

## (undated) DEVICE — DRAPE SHEET REV FOLD 3/4 9349

## (undated) DEVICE — SUTURE PDS 0 60IN CTX+ LOOPED PDP990G

## (undated) DEVICE — TUBING SMOKE EVAC PNEUMOCLEAR HIGH FLOW 0620050250

## (undated) DEVICE — SU PDS II 2-0 SH 27" Z317H

## (undated) DEVICE — PROTECTOR ARM STANDARD ONE STEP 40433 (COI)

## (undated) DEVICE — LUBRICANT INST ELECTROLUBE EL101

## (undated) DEVICE — DAVINCI HOT SHEARS TIP COVER  400180

## (undated) DEVICE — GLOVE UNDER INDICATOR PI SZ 7.0 LF 41670

## (undated) DEVICE — DRSG KERLIX 4 1/2"X4YDS ROLL 6715

## (undated) DEVICE — DAVINCI XI DRAPE ARM 470015

## (undated) DEVICE — SU VICRYL+ 3-0 27IN SH UND VCP416H

## (undated) DEVICE — PREP CHLORAPREP 26ML TINTED HI-LITE ORANGE 930815

## (undated) DEVICE — DAVINCI XI DRAPE COLUMN 470341

## (undated) DEVICE — BLADE KNIFE SURG 11 371111

## (undated) DEVICE — DRAPE MAYO STAND 29.5X55.5" 8339

## (undated) DEVICE — DRAPE U SPLIT 74X120" 29440

## (undated) DEVICE — DAVINCI XI SUCTION IRRIGATOR ENDOWRIST 480299

## (undated) DEVICE — DAVINCI XI OBTURATOR BLADELESS 8MM 470359

## (undated) DEVICE — ESU PENCIL SMOKE EVAC W/ROCKER SWITCH 0703-047-000

## (undated) DEVICE — CLIP ENDO HEMO-LOC PURPLE LG 544240

## (undated) DEVICE — CUSTOM PACK LAP CHOLE SBA5BLCHEA

## (undated) DEVICE — NDL INSUFFLATION 13GA 120MM C2201

## (undated) DEVICE — SU DERMABOND ADVANCED .7ML DNX12

## (undated) DEVICE — SU WND CLOSURE V-LOC 90 SZ 2-0 12" GS-21 VLOCM0315

## (undated) DEVICE — DECANTER VIAL 2006S

## (undated) DEVICE — DRAPE SHEET TABLE COVER KC 42301*

## (undated) RX ORDER — LABETALOL HYDROCHLORIDE 5 MG/ML
INJECTION, SOLUTION INTRAVENOUS
Status: DISPENSED
Start: 2024-10-04

## (undated) RX ORDER — KETOROLAC TROMETHAMINE 30 MG/ML
INJECTION, SOLUTION INTRAMUSCULAR; INTRAVENOUS
Status: DISPENSED
Start: 2024-10-04

## (undated) RX ORDER — PROPOFOL 10 MG/ML
INJECTION, EMULSION INTRAVENOUS
Status: DISPENSED
Start: 2024-10-04

## (undated) RX ORDER — LIDOCAINE HYDROCHLORIDE 10 MG/ML
INJECTION, SOLUTION EPIDURAL; INFILTRATION; INTRACAUDAL; PERINEURAL
Status: DISPENSED
Start: 2024-10-04

## (undated) RX ORDER — GLYCOPYRROLATE 0.2 MG/ML
INJECTION, SOLUTION INTRAMUSCULAR; INTRAVENOUS
Status: DISPENSED
Start: 2024-10-04

## (undated) RX ORDER — DEXAMETHASONE SODIUM PHOSPHATE 10 MG/ML
INJECTION, SOLUTION INTRAMUSCULAR; INTRAVENOUS
Status: DISPENSED
Start: 2024-10-04

## (undated) RX ORDER — ONDANSETRON 2 MG/ML
INJECTION INTRAMUSCULAR; INTRAVENOUS
Status: DISPENSED
Start: 2024-10-04

## (undated) RX ORDER — LIDOCAINE HYDROCHLORIDE AND EPINEPHRINE 10; 10 MG/ML; UG/ML
INJECTION, SOLUTION INFILTRATION; PERINEURAL
Status: DISPENSED
Start: 2024-10-04

## (undated) RX ORDER — FENTANYL CITRATE 50 UG/ML
INJECTION, SOLUTION INTRAMUSCULAR; INTRAVENOUS
Status: DISPENSED
Start: 2024-10-04